# Patient Record
Sex: FEMALE | Race: WHITE | URBAN - METROPOLITAN AREA
[De-identification: names, ages, dates, MRNs, and addresses within clinical notes are randomized per-mention and may not be internally consistent; named-entity substitution may affect disease eponyms.]

---

## 2017-06-08 ENCOUNTER — TRANSFERRED RECORDS (OUTPATIENT)
Dept: HEALTH INFORMATION MANAGEMENT | Facility: CLINIC | Age: 67
End: 2017-06-08

## 2017-06-09 ENCOUNTER — TRANSFERRED RECORDS (OUTPATIENT)
Dept: HEALTH INFORMATION MANAGEMENT | Facility: CLINIC | Age: 67
End: 2017-06-09

## 2017-06-27 ENCOUNTER — TRANSFERRED RECORDS (OUTPATIENT)
Dept: HEALTH INFORMATION MANAGEMENT | Facility: CLINIC | Age: 67
End: 2017-06-27

## 2017-08-09 ENCOUNTER — TRANSFERRED RECORDS (OUTPATIENT)
Dept: HEALTH INFORMATION MANAGEMENT | Facility: CLINIC | Age: 67
End: 2017-08-09

## 2017-08-10 ENCOUNTER — MEDICAL CORRESPONDENCE (OUTPATIENT)
Dept: HEALTH INFORMATION MANAGEMENT | Facility: CLINIC | Age: 67
End: 2017-08-10

## 2017-09-05 ENCOUNTER — TELEPHONE (OUTPATIENT)
Dept: NEUROLOGY | Facility: CLINIC | Age: 67
End: 2017-09-05

## 2017-09-05 NOTE — TELEPHONE ENCOUNTER
Pt was called to arrange for an appointment in the ataxia clinic. She is referred by Dr. ALIRIO Parker from [facility].  Pt has problems walking and balance which started 7 years ago. Her brothers and sister do not have the same problems. She has been to speech therapy. She was raised by her biological parents and has one biological child of her own and none of these relatives have similar problems. She has not been in an accident and has not used alcohol. An appointment was made for Oct 6 at 9am to see Colin Burns and Dr Sameera Verde.

## 2017-09-22 ASSESSMENT — ENCOUNTER SYMPTOMS
ARTHRALGIAS: 0
DIZZINESS: 1
EYE IRRITATION: 0
PARALYSIS: 0
BACK PAIN: 1
TINGLING: 0
NUMBNESS: 0
MYALGIAS: 1
WEAKNESS: 0
DIARRHEA: 0
NECK PAIN: 0
EYE REDNESS: 0
BLOATING: 0
SEIZURES: 0
EYE PAIN: 0
EYE WATERING: 1
JOINT SWELLING: 0
TREMORS: 0
VOMITING: 0
JAUNDICE: 0
CONSTIPATION: 0
HEARTBURN: 1
RECTAL BLEEDING: 0
BLOOD IN STOOL: 0
MEMORY LOSS: 0
NAUSEA: 0
SPEECH CHANGE: 1
ABDOMINAL PAIN: 0
MUSCLE WEAKNESS: 0
LOSS OF CONSCIOUSNESS: 0
HEADACHES: 0
STIFFNESS: 0
RECTAL PAIN: 0
MUSCLE CRAMPS: 1
DOUBLE VISION: 0
BOWEL INCONTINENCE: 0
DISTURBANCES IN COORDINATION: 0

## 2017-09-28 ENCOUNTER — PRE VISIT (OUTPATIENT)
Dept: NEUROLOGY | Facility: CLINIC | Age: 67
End: 2017-09-28

## 2017-09-28 NOTE — TELEPHONE ENCOUNTER
1.  Date/reason for appt:10/6/17, Ataxia     2.  Referring provider: [facility]    3.  Call to patient (Yes / No - short description): No, referred     4.  Previous care at / records requested from:   [facility]- faxed cover sheet.

## 2017-09-29 NOTE — TELEPHONE ENCOUNTER
Records received from [facility].   Included  Office notes: 6/8/17, 6/22/17  PT/OT notes: 6/16/17, 8/9/17  Radiology reports: MRI brain 6/6/17  MR cervical 6/9/17

## 2017-10-06 ENCOUNTER — OFFICE VISIT (OUTPATIENT)
Dept: NEUROLOGY | Facility: CLINIC | Age: 67
End: 2017-10-06

## 2017-10-06 ENCOUNTER — TELEPHONE (OUTPATIENT)
Dept: NEUROLOGY | Facility: CLINIC | Age: 67
End: 2017-10-06

## 2017-10-06 VITALS
HEART RATE: 70 BPM | BODY MASS INDEX: 26.64 KG/M2 | WEIGHT: 175.8 LBS | RESPIRATION RATE: 24 BRPM | DIASTOLIC BLOOD PRESSURE: 76 MMHG | SYSTOLIC BLOOD PRESSURE: 143 MMHG | HEIGHT: 68 IN | OXYGEN SATURATION: 97 % | TEMPERATURE: 98.2 F

## 2017-10-06 VITALS
DIASTOLIC BLOOD PRESSURE: 76 MMHG | TEMPERATURE: 98.2 F | WEIGHT: 175.9 LBS | OXYGEN SATURATION: 97 % | HEART RATE: 70 BPM | HEIGHT: 68 IN | SYSTOLIC BLOOD PRESSURE: 143 MMHG | BODY MASS INDEX: 26.66 KG/M2 | RESPIRATION RATE: 24 BRPM

## 2017-10-06 DIAGNOSIS — G11.9 CEREBELLAR ATAXIA (H): ICD-10-CM

## 2017-10-06 DIAGNOSIS — G11.9 CEREBELLAR ATAXIA (H): Primary | ICD-10-CM

## 2017-10-06 DIAGNOSIS — R27.0 ATAXIA: Primary | ICD-10-CM

## 2017-10-06 PROBLEM — G31.9 CEREBELLAR ATROPHY (H): Status: ACTIVE | Noted: 2017-10-06

## 2017-10-06 PROBLEM — E89.0 HYPOTHYROIDISM FOLLOWING RADIOIODINE THERAPY: Status: ACTIVE | Noted: 2017-10-06

## 2017-10-06 LAB — THYROPEROXIDASE AB SERPL-ACNC: 31 IU/ML

## 2017-10-06 RX ORDER — LORAZEPAM 0.5 MG/1
1 TABLET ORAL PRN
COMMUNITY
Start: 2017-06-08

## 2017-10-06 RX ORDER — LEVOTHYROXINE SODIUM 75 UG/1
75 TABLET ORAL DAILY
COMMUNITY
Start: 2017-04-21 | End: 2018-04-21

## 2017-10-06 RX ORDER — CITALOPRAM HYDROBROMIDE 10 MG/1
30 TABLET ORAL DAILY
COMMUNITY
Start: 2017-02-28

## 2017-10-06 RX ORDER — FLUOCINOLONE ACETONIDE 0.11 MG/ML
1 OIL TOPICAL PRN
COMMUNITY
Start: 2016-02-26

## 2017-10-06 RX ORDER — ACETAMINOPHEN 500 MG
500 TABLET ORAL EVERY 6 HOURS PRN
COMMUNITY
Start: 2014-08-19

## 2017-10-06 RX ORDER — POLYETHYLENE GLYCOL 3350 17 G/17G
17 POWDER, FOR SOLUTION ORAL PRN
COMMUNITY
Start: 2016-01-11

## 2017-10-06 ASSESSMENT — PAIN SCALES - GENERAL
PAINLEVEL: NO PAIN (0)
PAINLEVEL: NO PAIN (0)

## 2017-10-06 NOTE — PROGRESS NOTES
"[facility] Neurology  [facility]    Anni Valverde  YOB: 1950  MRN: [id number]    Reason for visit: Referred by Dr. Parker from [facility] for evaluation of ataxia    History of Present Illness:    Anni Valverde is a 67 year old right handed woman who was referred for evaluation of ataxia.     She first noticed balance trouble about five years ago, but it was not severe enough to seek medical care. Three years ago, she had more trouble when standing at the sink washing her hair, so she saw her PCP. She noted some improvement with physical therapy. At the time they wondered if she had an inner ear issue. She has fallen about twice in the last few years. She is rather cautious with her walking. This year she sustained a fall on vacation while getting off a boat because there was no railing to hold onto. She is able to walk unassisted but she would prefer to hold her 's arm to cross the street and to get up the steps if there is no rail.     About one year ago she started having slurred speech, especially with lingual phonemes. Her speech has gotten slightly worse recently. Dysarthria tends to be worse when she is tired, anxious or stressed. In retrospect she noticed trouble enunciating the last couple words of sentences for years. She met with a speech therapist 6/2017 and was told she has a \"slight speech impediment\" and did not need ongoing therapy.     Patient denied lightheadedness, syncope, urinary incontinence, and fecal incontinence. No tremor. She denied problems with coordination in her arms. She did endorse problems with fine motor movements - she noticed issues with handwriting, jewelry, and fingernail clippers. She denied numbness/tingling. No weakness.     No history of meningitis, encephalitis or autoimmune disease. Of note, she had thyroid cancer s/p resection in 1998, which has been in remission since her surgery.    Medications:  Outpatient Prescriptions " "Marked as Taking for the 10/6/17 encounter (Office Visit) with Areli Verde MD   Medication Sig     LORazepam (ATIVAN) 0.5 MG tablet Take 1 tablet by mouth as needed     levothyroxine (SYNTHROID/LEVOTHROID) 75 MCG tablet Take 75 mcg by mouth daily     citalopram (CELEXA) 10 MG tablet Take 30 mg by mouth daily     Fluocinolone Acetonide (FLUOCINOLONE ACETAONIDE) 0.01 % oil Apply 1 Application topically as needed     polyethylene glycol (MIRALAX/GLYCOLAX) Packet Take 17 g by mouth as needed     acetaminophen (TYLENOL) 500 MG tablet Take 500 mg by mouth every 6 hours as needed     aspirin-acetaminophen-caffeine (EXCEDRIN MIGRAINE) 250-250-65 MG per tablet Take 1 tablet by mouth as needed     calcium-vitamin D (CALTRATE) 600-400 MG-UNIT per tablet Take 1 tablet by mouth 2 times daily       Past Medical History:  Papillary thyroid cancer, s/p resection in . In remission. Apparently was unusual because it was a \"hot nodule.\" Initially cancer was not suspected, but intraoperative touch prep showed papillary pattern and entire thyroid was removed.    Post operative hypothyroidism   Anxiety  Depression  Migraine - headaches stopped after FPC, patient thinks due to reduced stress    S/p Hysterectomy for endometriosis. Later also had BSO.     Family History:  Maternal grandmother had high blood pressure  Maternal grandfather didn't have any significant medical problems  Paternal grandfather had a heart attack at 88  Paternal grandmother  of \"old age\" at 91     Father was 85 when he , he had strokes, CAD  Mother  at age 85, dementia, kidney failure, CHF  Older sister (Viki), age 67, has health problems - pancreatitis, anxiety, GI bleeding, anemia, unclear diagnosis  Brother (Madalyn) is diabetic  Sister (Gosia) has high blood pressure    Daughter (Eliana), 44, is healthy.       Surgical History:  See above    Social History:  Originally from [place]. Moved to [place], [place] with her  " "for his work, then most recently to [place] to be near their daughter, son in law, and grandchildren. She is  to [place].     Retired, was  for [employer].    She is a never smoker.     Does not drink alcohol. No recreational drug use.     Allergies:  Ibuprofen - swelling in the lips with stronger than usual dose after surgery    Review of Systems:  Remainder of 10 point review of systems is negative. Pertinent positives and negatives above and in HPI    Physical Exam:    Vitals: Pulse 70  Resp 24  Ht 1.715 m (5' 7.5\")  Wt 79.8 kg (175 lb 14.4 oz)  SpO2 97%  Breastfeeding? No  BMI 27.14 kg/m2  General: Cooperative, no acute distress  HEENT: Normocephalic and nontraumatic, sclera white, moist mucous membranes  Cardiac: Regular rate and rhythm  Respiratory: Nonlabored breathing  Extremities: Distal pulses intact. No LE edema.    NEUROLOGIC:  MENTAL STATUS: Fully alert, attentive and oriented.  SPEECH: Spastic dysarthria noted. Sluring of lingual phonemes.   FACIAL EXPRESSION: Normal expression.     CRANIAL NERVES: Optic nerves flat, normal contour and color. Normal retina. Visual fields intact. EOM full with smooth pursuit. No nystagmus or diplopia. Normal saccades. Facial sensation intact/symmetric. Facial movements symmetric. No apraxia of eyelid opening. Blepharospasm of the right eye with forceful closing. Palate elevation symmetric, uvula midline. No palatal tremor. Tongue protrusion midline.    MOTOR:   INVOLUNTARY MOVEMENTS - No tremor at rest, with action or on posture. No myoclonus or asterixis.   AGILITY - Very slight slowing on the right with finger tapping (0.5 grading) and slight on the left with 1-2 interruptions (1), but hand opening closing and pronation/supination are normal. Amplitude of movements are normal. Foot tapping and leg agility nearly normal, just a bit slow.   TONE - Normal tone in the upper and lower extremities.   STRENGTH - No pronator drift. "   (MRC grading out of 5)   UE: SAb EF EE WE FDI    (R/L) 5/5 5/5 5/5 5/5 5/5      LE: HF KF KE DF    (R/L) 5/5 5/5 5/5 5/5     REFLEXES: Brisk without spread and symmetric at bilateral triceps, biceps, brachioradialis, patella and Achilles. No clonus. Toes upgoing bilaterally, but somewhat difficult to interpret due to patient withdrawing her foot. Negative Dominick.   SENSORY: Intact/symmetric to light touch, pinprick and vibratory sensation throughout upper and lower extremities.     COORDINATION: Finger-nose-finger without dysmetria on the right, slight dysmetria on the left. She has some delay with mirror movements. Mild dysdiadochokinesia noted in the bilateral upper extremities with rapid alternating movements. No rebound in the arms.   GAIT: Able to rise from chair with arms crossed over chest but with some hestitation. Posture is upright. Normal base, normal stride. Arm swing is diminished bilaterally. Turns well in a single step. Takes 1-2 steps with pull test. She is very unsteady with tandem.     Pertinent investigations:    Outside records reviewed to help establish timeline and guide testing    MRI brain personally reviewed. There is significant cerebellar atrophy. No clear pontine atrophy. No abnormal T2 signal seen in the cerebellum and no abnormal contrast enhancement. Curiously, on T1, there are hypodensities in the glory - but this is not seen on T2.     Impression:   Anni Valverde is a 67 year old female with a history of thyroid cancer who was referred for evaluation of slowly progressive ataxia over the course of 5 years. At this time, she has a cerebellar ataxia of unclear etiology. Broadly speaking, causes of chronic progressive ataxia include heritable ataxias, autoimmune conditions, vitamin deficiencies, neurodegenerative diseases and toxic exposure. If she were to have a heritable ataxia, it would likely be recessive due to lack of a family history, therefore risk to subsequent  generations should be quite small. However a sporadic ataxia or late onset autosomal dominant disease cannot be excluded. We considered the possibility of MSA-C, but her course has been very slow, which is atypical of MSA. Additionally, we do not see characteristic findings of MSA on her MRI. We will check for potentially reversible causes of ataxias below - vitamin deficiencies and autoimmune conditions.     Recommendations:  - TPO and anti-thyroglobulin antibodies  - Celiac panel IgG and IgA  - Anti-KENNY  - Vitamin E  - Consider genetic testing in the future, discussed with genetic counselor Colin Burns  - Follow up in one year    Patient seen and discussed with Dr. Verde.    Zelda Arreola MD  Movement Disorders Fellow

## 2017-10-06 NOTE — NURSING NOTE
Chief Complaint   Patient presents with     Consult     [facility]- ATAXIA- GENETIC COUNSELING     Candido Mcintyre, CMA

## 2017-10-06 NOTE — PROGRESS NOTES
GENETIC COUNSELING-Neurology clinic  I met with Anni and her  for 25 minutes in the ataxia clinic at the [facility].  Anni was referred for evaluation due to history of ataxia and cerebellar atrophy on MRI.    MEDICAL HISTORY  Please see Dr. Verde's clinic note for a more thorough summary of medical history. Briefly, Anni reports issues with balance beginning about 4 years ago and issues with slurred speech beginning about 1 year ago.  She has MRI finding of cerebellar atrophy.  From review of records, I do not see that any genetic testing has been performed. I did contact [facility] and there is no record there of genetic testing for ataxia.    FAMILY HISTORY-see scanned pedigree  1. Anni has 3 siblings, none of whom have any neurologic disease.  2. Anni has a healthy daughter and three grandchildren.  3. Anni's father  at age 85 from multiple strokes. No other neurologic disease reported in paternal family.  4. Anni's mother develop dementia in her late 70's and  at age 85. Anni's  indicated that Anni's mother's dementia did not seem the same as other friends that they have known with Alzheimer disease in that she seemed to lose her long term memory first.  5. A maternal aunt was paralyzed since childhood from an accident.  6. No other neurologic disease reported in maternal family.    GENETIC COUNSELING-  We discussed the genetic and environmental basis of ataxia. I explained that in most cases, it results from a complex interaction of multiple genetic and environmental factors. I explained that in some instances, we can identify a single gene cause. We discussed both autosomal dominant and autosomal recessive patterns of inheritance, which are the two most common patterns we observe with hereditary ataxias.  We discussed the implications of each pattern of inheritance for potential risks to siblings and children.  I explained that a dominant  form would place children at 50% risk, while a recessive form would translate into a minimal risk to children.    I explained that the family history certainly does NOT strongly suggest a dominant form given that both parents lived until age 85 without clear signs of ataxia although we cannot definitively exclude dominant inheritance based on family history.  I explained that we have now a fair bit of experience diagnosing autosomal recessive forms of ataxia/hereditary spastic paraplegia in adults with what appear to be isolated/sporadic neurologic disease--thus, this remains a distinct possibility.    We discussed potential options for gene testing. I explained that a small group of autosomal dominant ataxias (SCA1,2,3,6,7) represent the most frequent dominant ataxias and the types that would have the most significant concern in terms of risks to children and grandchildren (SCA2, SCA7 in particular).  Thus, while the family history does not strongly suggest a dominant form, one could pursue this targeted group of conditions to exclude the most common dominant forms and provide peace of mind in terms of potential risks to children and grandchildren.     Alternatively, if we presume that this is more likely a recessive disease, then we would likely consider a more broad-based next generation sequencing approach to look for hereditary ataxias/spastic paraplegias.      We discussed risks/benefits/limitaitons/utility of gene testing. I explained that in some cases, it may provide a precise diagnosis, which can be important for some patients. I also explained that if we identify a specific condition, the we could provide more precise guidance to family members about their risks.    For the time being, Zaida and her  wanted to discuss these options and will get back to me if they want to pursue genetic testing.    Colin Burns MS, [facility] [place]  Certified Genetic Counselor

## 2017-10-06 NOTE — MR AVS SNAPSHOT
"              After Visit Summary   10/6/2017    Anni Valverde    MRN: [id number]           Patient Information     Date Of Birth          1950        Visit Information        Provider Department      10/6/2017 9:00 AM Angelito Burns GC [facility]        Today's Diagnoses     Ataxia    -  1       Follow-ups after your visit        Who to contact     Please call your clinic at [phone] to:    Ask questions about your health    Make or cancel appointments    Discuss your medicines    Learn about your test results    Speak to your doctor   If you have compliments or concerns about an experience at your clinic, or if you wish to file a complaint, please contact [facility] Patient Relations at [phone] or email us at [email or web address]         Additional Information About Your Visit        MyChart Information     [employer] gives you secure access to your electronic health record. If you see a primary care provider, you can also send messages to your care team and make appointments. If you have questions, please call your primary care clinic.  If you do not have a primary care provider, please call [phone] and they will assist you.      [employer] is an electronic gateway that provides easy, online access to your medical records. With [employer], you can request a clinic appointment, read your test results, renew a prescription or communicate with your care team.     To access your existing account, please contact your [facility] or call [phone] for assistance.        Care EveryWhere ID     This is your Care EveryWhere ID. This could be used by other organizations to access your [place] medical records  [id number]        Your Vitals Were     Pulse Temperature Respirations Height Pulse Oximetry Breastfeeding?    70 98.2  F (36.8  C) 24 1.715 m (5' 7.5\") 97% No    BMI (Body Mass Index)                   27.13 kg/m2         "    Blood Pressure from Last 3 Encounters:   10/06/17 143/76   10/06/17 143/76    Weight from Last 3 Encounters:   10/06/17 79.7 kg (175 lb 12.8 oz)   10/06/17 79.8 kg (175 lb 14.4 oz)              Today, you had the following     No orders found for display       Primary Care Provider Office Phone # Fax #    Brendon Morgan [phone]       [facility] [address] [place] [address]        Equal Access to Services     [facility]: Hadii aad ku hadasho Soomaali, waaxda luqadaha, qaybta kaalmada adeegyada, waxpatricia zayas . So [facility] [phone].    ATENCIÓN: Si habla español, tiene a pendleton disposición servicios gratuitos de asistencia lingüística. [facility] [phone].    We comply with applicable federal civil rights laws and [place] laws. We do not discriminate on the basis of race, color, national origin, age, disability, sex, sexual orientation, or gender identity.            Thank you!     Thank you for choosing [facility]UR[facility]  for your care. Our goal is always to provide you with excellent care. Hearing back from our patients is one way we can continue to improve our services. Please take a few minutes to complete the written survey that you may receive in the mail after your visit with us. Thank you!             Your Updated Medication List - Protect others around you: Learn how to safely use, store and throw away your medicines at www.disposemymeds.org.          This list is accurate as of: 10/6/17 12:35 PM.  Always use your most recent med list.                   Brand Name Dispense Instructions for use Diagnosis    acetaminophen 500 MG tablet    TYLENOL     Take 500 mg by mouth every 6 hours as needed        aspirin-acetaminophen-caffeine 250-250-65 MG per tablet    EXCEDRIN MIGRAINE     Take 1 tablet by mouth as needed        calcium-vitamin D 600-400 MG-UNIT per tablet    CALTRATE     Take 1 tablet by mouth 2 times daily         citalopram 10 MG tablet    celeXA     Take 30 mg by mouth daily        fluocinolone acetaonide 0.01 % oil      Apply 1 Application topically as needed        levothyroxine 75 MCG tablet    SYNTHROID/LEVOTHROID     Take 75 mcg by mouth daily        LORazepam 0.5 MG tablet    ATIVAN     Take 1 tablet by mouth as needed        polyethylene glycol Packet    MIRALAX/GLYCOLAX     Take 17 g by mouth as needed

## 2017-10-06 NOTE — LETTER
10/6/2017       RE: Anni Valverde  [address]  [place] [facility] [address]     Dear Colleague,    Thank you for referring your patient, Anni Valverde, to the [facility]UROLOGY at [facility]. Please see a copy of my visit note below.    GENETIC COUNSELING-Neurology clinic  I met with Anni and her  for 25 minutes in the ataxia clinic at the [facility].  Anni was referred for evaluation due to history of ataxia and cerebellar atrophy on MRI.    MEDICAL HISTORY  Please see Dr. Vrede's clinic note for a more thorough summary of medical history. Briefly, Anni reports issues with balance beginning about 4 years ago and issues with slurred speech beginning about 1 year ago.  She has MRI finding of cerebellar atrophy.  From review of records, I do not see that any genetic testing has been performed. I did contact [facility] and there is no record there of genetic testing for ataxia.    FAMILY HISTORY-see scanned pedigree  1. Anni has 3 siblings, none of whom have any neurologic disease.  2. Anni has a healthy daughter and three grandchildren.  3. Anni's father  at age 85 from multiple strokes. No other neurologic disease reported in paternal family.  4. Anni's mother develop dementia in her late 70's and  at age 85. Anni's  indicated that Anni's mother's dementia did not seem the same as other friends that they have known with Alzheimer disease in that she seemed to lose her long term memory first.  5. A maternal aunt was paralyzed since childhood from an accident.  6. No other neurologic disease reported in maternal family.    GENETIC COUNSELING-  We discussed the genetic and environmental basis of ataxia. I explained that in most cases, it results from a complex interaction of multiple genetic and environmental factors. I explained that in some instances, we can identify a single gene cause. We discussed  both autosomal dominant and autosomal recessive patterns of inheritance, which are the two most common patterns we observe with hereditary ataxias.  We discussed the implications of each pattern of inheritance for potential risks to siblings and children.  I explained that a dominant form would place children at 50% risk, while a recessive form would translate into a minimal risk to children.    I explained that the family history certainly does NOT strongly suggest a dominant form given that both parents lived until age 85 without clear signs of ataxia although we cannot definitively exclude dominant inheritance based on family history.  I explained that we have now a fair bit of experience diagnosing autosomal recessive forms of ataxia/hereditary spastic paraplegia in adults with what appear to be isolated/sporadic neurologic disease--thus, this remains a distinct possibility.    We discussed potential options for gene testing. I explained that a small group of autosomal dominant ataxias (SCA1,2,3,6,7) represent the most frequent dominant ataxias and the types that would have the most significant concern in terms of risks to children and grandchildren (SCA2, SCA7 in particular).  Thus, while the family history does not strongly suggest a dominant form, one could pursue this targeted group of conditions to exclude the most common dominant forms and provide peace of mind in terms of potential risks to children and grandchildren.     Alternatively, if we presume that this is more likely a recessive disease, then we would likely consider a more broad-based next generation sequencing approach to look for hereditary ataxias/spastic paraplegias.      We discussed risks/benefits/limitaitons/utility of gene testing. I explained that in some cases, it may provide a precise diagnosis, which can be important for some patients. I also explained that if we identify a specific condition, the we could provide more precise  guidance to family members about their risks.    For the time being, Zaida and her  wanted to discuss these options and will get back to me if they want to pursue genetic testing.    Colin Burns MS, [facility] [place]  Certified Genetic Counselor

## 2017-10-06 NOTE — LETTER
"10/6/2017       RE: Anni Valverde  [address]  [facility] [address]     Dear Colleague,    Thank you for referring your patient, Anni Valverde, to the [facility] NEUROLOGY at [facility]. Please see a copy of my visit note below.    [facility] Neurology  Movement Disorder Clinic    Anni Valverde  YOB: 1950  MRN: [id number]    Reason for visit: Referred by Dr. Parker from [facility] for evaluation of ataxia    History of Present Illness:    Anni Valverde is a 67 year old right handed woman who was referred for evaluation of ataxia.     She first noticed balance trouble about five years ago, but it was not severe enough to seek medical care. Three years ago, she had more trouble when standing at the sink washing her hair, so she saw her PCP. She noted some improvement with physical therapy. At the time they wondered if she had an inner ear issue. She has fallen about twice in the last few years. She is rather cautious with her walking. This year she sustained a fall on vacation while getting off a boat because there was no railing to hold onto. She is able to walk unassisted but she would prefer to hold her 's arm to cross the street and to get up the steps if there is no rail.     About one year ago she started having slurred speech, especially with lingual phonemes. Her speech has gotten slightly worse recently. Dysarthria tends to be worse when she is tired, anxious or stressed. In retrospect she noticed trouble enunciating the last couple words of sentences for years. She met with a speech therapist 6/2017 and was told she has a \"slight speech impediment\" and did not need ongoing therapy.     Patient denied lightheadedness, syncope, urinary incontinence, and fecal incontinence. No tremor. She denied problems with coordination in her arms. She did endorse problems with fine motor movements - she noticed issues with handwriting, " "jewelry, and fingernail clippers. She denied numbness/tingling. No weakness.     No history of meningitis, encephalitis or autoimmune disease. Of note, she had thyroid cancer s/p resection in , which has been in remission since her surgery.    Medications:  Outpatient Prescriptions Marked as Taking for the 10/6/17 encounter (Office Visit) with Areli Verde MD   Medication Sig     LORazepam (ATIVAN) 0.5 MG tablet Take 1 tablet by mouth as needed     levothyroxine (SYNTHROID/LEVOTHROID) 75 MCG tablet Take 75 mcg by mouth daily     citalopram (CELEXA) 10 MG tablet Take 30 mg by mouth daily     Fluocinolone Acetonide (FLUOCINOLONE ACETAONIDE) 0.01 % oil Apply 1 Application topically as needed     polyethylene glycol (MIRALAX/GLYCOLAX) Packet Take 17 g by mouth as needed     acetaminophen (TYLENOL) 500 MG tablet Take 500 mg by mouth every 6 hours as needed     aspirin-acetaminophen-caffeine (EXCEDRIN MIGRAINE) 250-250-65 MG per tablet Take 1 tablet by mouth as needed     calcium-vitamin D (CALTRATE) 600-400 MG-UNIT per tablet Take 1 tablet by mouth 2 times daily       Past Medical History:  Papillary thyroid cancer, s/p resection in . In remission. Apparently was unusual because it was a \"hot nodule.\" Initially cancer was not suspected, but intraoperative touch prep showed papillary pattern and entire thyroid was removed.    Post operative hypothyroidism   Anxiety  Depression  Migraine - headaches stopped after half-way, patient thinks due to reduced stress    S/p Hysterectomy for endometriosis. Later also had BSO.     Family History:  Maternal grandmother had high blood pressure  Maternal grandfather didn't have any significant medical problems  Paternal grandfather had a heart attack at 88  Paternal grandmother  of \"old age\" at 91     Father was 85 when he , he had strokes, CAD  Mother  at age 85, dementia, kidney failure, CHF  Older sister (Viki), age 67, has health problems - " "pancreatitis, anxiety, GI bleeding, anemia, unclear diagnosis  Brother (Madalyn) is diabetic  Sister (Gosia) has high blood pressure    Daughter (Eliana), 44, is healthy.       Surgical History:  See above    Social History:  Originally from [place]. Moved to [place], [place] with her  for his work, then most recently to [place] to be near their daughter, son in law, and grandchildren. She is  to [place].     Retired, was  for [employer].    She is a never smoker.     Does not drink alcohol. No recreational drug use.     Allergies:  Ibuprofen - swelling in the lips with stronger than usual dose after surgery    Review of Systems:  Remainder of 10 point review of systems is negative. Pertinent positives and negatives above and in HPI    Physical Exam:    Vitals: Pulse 70  Resp 24  Ht 1.715 m (5' 7.5\")  Wt 79.8 kg (175 lb 14.4 oz)  SpO2 97%  Breastfeeding? No  BMI 27.14 kg/m2  General: Cooperative, no acute distress  HEENT: Normocephalic and nontraumatic, sclera white, moist mucous membranes  Cardiac: Regular rate and rhythm  Respiratory: Nonlabored breathing  Extremities: Distal pulses intact. No LE edema.    NEUROLOGIC:  MENTAL STATUS: Fully alert, attentive and oriented.  SPEECH: Spastic dysarthria noted. Sluring of lingual phonemes.   FACIAL EXPRESSION: Normal expression.     CRANIAL NERVES: Optic nerves flat, normal contour and color. Normal retina. Visual fields intact. EOM full with smooth pursuit. No nystagmus or diplopia. Normal saccades. Facial sensation intact/symmetric. Facial movements symmetric. No apraxia of eyelid opening. Blepharospasm of the right eye with forceful closing. Palate elevation symmetric, uvula midline. No palatal tremor. Tongue protrusion midline.    MOTOR:   INVOLUNTARY MOVEMENTS - No tremor at rest, with action or on posture. No myoclonus or asterixis.   AGILITY - Very slight slowing on the right with finger tapping (0.5 grading) and " slight on the left with 1-2 interruptions (1), but hand opening closing and pronation/supination are normal. Amplitude of movements are normal. Foot tapping and leg agility nearly normal, just a bit slow.   TONE - Normal tone in the upper and lower extremities.   STRENGTH - No pronator drift.   (MRC grading out of 5)   UE: SAb EF EE WE FDI    (R/L) 5/5 5/5 5/5 5/5 5/5      LE: HF KF KE DF    (R/L) 5/5 5/5 5/5 5/5     REFLEXES: Brisk without spread and symmetric at bilateral triceps, biceps, brachioradialis, patella and Achilles. No clonus. Toes upgoing bilaterally, but somewhat difficult to interpret due to patient withdrawing her foot. Negative Dominick.   SENSORY: Intact/symmetric to light touch, pinprick and vibratory sensation throughout upper and lower extremities.     COORDINATION: Finger-nose-finger without dysmetria on the right, slight dysmetria on the left. She has some delay with mirror movements. Mild dysdiadochokinesia noted in the bilateral upper extremities with rapid alternating movements. No rebound in the arms.   GAIT: Able to rise from chair with arms crossed over chest but with some hestitation. Posture is upright. Normal base, normal stride. Arm swing is diminished bilaterally. Turns well in a single step. Takes 1-2 steps with pull test. She is very unsteady with tandem.     Pertinent investigations:    Outside records reviewed to help establish timeline and guide testing    MRI brain personally reviewed. There is significant cerebellar atrophy. No clear pontine atrophy. No abnormal T2 signal seen in the cerebellum and no abnormal contrast enhancement. Curiously, on T1, there are hypodensities in the glory - but this is not seen on T2.     Impression:   Anni Valverde is a 67 year old female with a history of thyroid cancer who was referred for evaluation of slowly progressive ataxia over the course of 5 years. At this time, she has a cerebellar ataxia of unclear etiology. Broadly speaking,  causes of chronic progressive ataxia include heritable ataxias, autoimmune conditions, vitamin deficiencies, neurodegenerative diseases and toxic exposure. If she were to have a heritable ataxia, it would likely be recessive due to lack of a family history, therefore risk to subsequent generations should be quite small. However a sporadic ataxia or late onset autosomal dominant disease cannot be excluded. We considered the possibility of MSA-C, but her course has been very slow, which is atypical of MSA. Additionally, we do not see characteristic findings of MSA on her MRI. We will check for potentially reversible causes of ataxias below - vitamin deficiencies and autoimmune conditions.     Recommendations:  - TPO and anti-thyroglobulin antibodies  - Celiac panel IgG and IgA  - Anti-KENNY  - Vitamin E  - Consider genetic testing in the future, discussed with genetic counselor Colin Burns  - Follow up in one year    Patient seen and discussed with Dr. Verde.    Zelda Arreola MD  Movement Disorders Fellow      Again, thank you for allowing me to participate in the care of your patient.      Sincerely,    Areli Verde MD

## 2017-10-06 NOTE — MR AVS SNAPSHOT
After Visit Summary   10/6/2017    Anni Valverde    MRN: [id number]           Patient Information     Date Of Birth          1950        Visit Information        Provider Department      10/6/2017 9:00 AM Areli Verde MD [facility] Neurology        Today's Diagnoses     Cerebellar ataxia (H)    -  1      Care Instructions    Today you saw Dr Arreola & Dr Verde and genetic counselor Colin Burns for evaluation of cerebellar ataxia. We are unsure of the cause of the cerebellar ataxia at this time. The causes could be a sporadic genetic condition, recessive genetic condition, combination of genetics and environment, vitamin deficiency, autoimmune process (related to abnormal antibody) or Celiac disease. We will check some labs today to look for treatable causes of cerebellar ataxia.     We will also send you with information regarding genetic testing for you to consider in the future.           Follow-ups after your visit        Future tests that were ordered for you today     Open Future Orders        Priority Expected Expires Ordered    Thyroid peroxidase antibody Routine  10/6/2018 10/6/2017    Thyroglobulin and antibody Routine  10/6/2018 10/6/2017    Tissue transglutaminase jaquan IgA and IgG Routine  10/6/2018 10/6/2017    Glutamic acid decarboxylase antibody Routine  10/6/2018 10/6/2017    Vitamin E Routine  10/6/2018 10/6/2017            Who to contact     Please call your clinic at [phone] to:    Ask questions about your health    Make or cancel appointments    Discuss your medicines    Learn about your test results    Speak to your doctor   If you have compliments or concerns about an experience at your clinic, or if you wish to file a complaint, please contact [facility] Patient Relations at [phone] or email us at [email or web address]         Additional Information About Your Visit        MyChart Information     MyChart gives you  "secure access to your electronic health record. If you see a primary care provider, you can also send messages to your care team and make appointments. If you have questions, please call your primary care clinic.  If you do not have a primary care provider, please call [phone] and they will assist you.      artaculous is an electronic gateway that provides easy, online access to your medical records. With artaculous, you can request a clinic appointment, read your test results, renew a prescription or communicate with your care team.     To access your existing account, please contact your [facility] or call [phone] for assistance.        Care EveryWhere ID     This is your Care EveryWhere ID. This could be used by other organizations to access your [facility] medical records  [id number]        Your Vitals Were     Pulse Temperature Respirations Height Pulse Oximetry Breastfeeding?    70 98.2  F (36.8  C) (Oral) 24 1.715 m (5' 7.5\") 97% No    BMI (Body Mass Index)                   27.14 kg/m2            Blood Pressure from Last 3 Encounters:   10/06/17 143/76   10/06/17 143/76    Weight from Last 3 Encounters:   10/06/17 79.7 kg (175 lb 12.8 oz)   10/06/17 79.8 kg (175 lb 14.4 oz)               Primary Care Provider Office Phone # Fax #    Brendon Morgan [phone]       [facility] [address] [place] [address]        Equal Access to Services     LUANA DELGADO : Hadii aad ku hadasho Soomaali, waaxda luqadaha, qaybta kaalmada adeegyada, andrzej zayas . So [facility] [phone].    ATENCIÓN: Si habla español, tiene a pendleton disposición servicios gratuitos de asistencia lingüística. Llame al [phone].    We comply with applicable federal civil rights laws and [place] laws. We do not discriminate on the basis of race, color, national origin, age, disability, sex, sexual orientation, or gender identity.          "   Thank you!     Thank you for choosing [facility] NEUROLOGY  for your care. Our goal is always to provide you with excellent care. Hearing back from our patients is one way we can continue to improve our services. Please take a few minutes to complete the written survey that you may receive in the mail after your visit with us. Thank you!             Your Updated Medication List - Protect others around you: Learn how to safely use, store and throw away your medicines at www.disposemymeds.org.          This list is accurate as of: 10/6/17 10:59 AM.  Always use your most recent med list.                   Brand Name Dispense Instructions for use Diagnosis    acetaminophen 500 MG tablet    TYLENOL     Take 500 mg by mouth every 6 hours as needed        aspirin-acetaminophen-caffeine 250-250-65 MG per tablet    EXCEDRIN MIGRAINE     Take 1 tablet by mouth as needed        calcium-vitamin D 600-400 MG-UNIT per tablet    CALTRATE     Take 1 tablet by mouth 2 times daily        citalopram 10 MG tablet    celeXA     Take 30 mg by mouth daily        fluocinolone acetaonide 0.01 % oil      Apply 1 Application topically as needed        levothyroxine 75 MCG tablet    SYNTHROID/LEVOTHROID     Take 75 mcg by mouth daily        LORazepam 0.5 MG tablet    ATIVAN     Take 1 tablet by mouth as needed        polyethylene glycol Packet    MIRALAX/GLYCOLAX     Take 17 g by mouth as needed

## 2017-10-06 NOTE — TELEPHONE ENCOUNTER
Mailed Application for Disability Parking Certificate to the [place] [employer] Public Safety, placed copy in basket for scanning.

## 2017-10-06 NOTE — PATIENT INSTRUCTIONS
Today you saw Dr Arreola & Dr Verde and genetic counselor Colin Burns for evaluation of cerebellar ataxia. We are unsure of the cause of the cerebellar ataxia at this time. The causes could be a sporadic genetic condition, recessive genetic condition, combination of genetics and environment, vitamin deficiency, autoimmune process (related to abnormal antibody) or Celiac disease. We will check some labs today to look for treatable causes of cerebellar ataxia.     We will also send you with information regarding genetic testing for you to consider in the future.

## 2017-10-08 LAB — GAD65 AB SER IA-ACNC: <5 IU/ML (ref 0–5)

## 2017-10-09 LAB
A-TOCOPHEROL VIT E SERPL-MCNC: 11.2 MG/L (ref 5.5–18)
BETA+GAMMA TOCOPHEROL SERPL-MCNC: 2.3 MG/L (ref 0–6)

## 2017-10-10 LAB
TTG IGA SER-ACNC: <1 U/ML
TTG IGG SER-ACNC: <1 U/ML

## 2017-10-17 LAB — LAB SCANNED RESULT: NORMAL

## 2018-03-09 NOTE — PROGRESS NOTES
I have personally interviewed and examined Ms. Anni Valverde and agree with diagnosis and management. The total time spent with the patient was 45 minutes, over 50% of the time spent in counseling and coordinating care.

## 2018-04-22 ENCOUNTER — HOSPITAL ENCOUNTER (EMERGENCY)
Facility: CLINIC | Age: 68
Discharge: HOME | End: 2018-04-22
Attending: EMERGENCY MEDICINE | Admitting: EMERGENCY MEDICINE
Payer: MEDICARE

## 2018-04-22 VITALS
DIASTOLIC BLOOD PRESSURE: 69 MMHG | HEART RATE: 92 BPM | WEIGHT: 170 LBS | SYSTOLIC BLOOD PRESSURE: 150 MMHG | RESPIRATION RATE: 16 BRPM | HEIGHT: 67 IN | BODY MASS INDEX: 26.68 KG/M2 | OXYGEN SATURATION: 97 % | TEMPERATURE: 98.4 F
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
## 2018-04-22 DIAGNOSIS — S01.81XA FACIAL LACERATION, INITIAL ENCOUNTER: ICD-10-CM

## 2018-04-22 PROCEDURE — 99283 EMERGENCY DEPT VISIT LOW MDM: CPT

## 2018-04-22 ASSESSMENT — ENCOUNTER SYMPTOMS
WOUND: 1
CHILLS: 0
NAUSEA: 0
VOMITING: 0
FEVER: 0
COUGH: 0
NUMBNESS: 0

## 2018-04-22 NOTE — ED PROVIDER NOTES
"  History     Chief Complaint:  Fall    HPI   Anni Valverde is a 68 year old female who presents to the ED for evaluation after a fall. 30 minutes ago the patient was walking when she tripped over a bump on the road and fell face forward onto the concrete. She reports getting an abrasion to her nose and \"spitting up skin\". The patient reports having ataxia as well due to a medical history of cerebellar atrophy. The patient denies loss of consciousness, nausea, vomiting, numbness, or taking any drugs or alcohol today. She is not currently on any blood thinners. The patient's last tetanus shot was in 2009.    Allergies:  Ibuprofen     Medications:    Tylenol  Excedrin  Caltrate  Celexa  Synthroid  Ativan  Miralax    Past Medical History:    Anxiety  Depression  Endometriosis  Hypothyroidism  Thyroid Cancer  Cerebellar atrophy  Ataxia    Past Surgical History:    Hysterectomy  Salpingo-oophorectomy bilateral  Thyroidectomy    Family History:    CAD  Pancreatitis  Anemia  Diabetes  Dementia  Kidney Disease    Social History:  Smoking status: Never  Alcohol use: No  Marital Status:  Single [1]     Review of Systems   Constitutional: Negative for chills and fever.   Respiratory: Negative for cough.    Gastrointestinal: Negative for nausea and vomiting.   Skin: Positive for wound.   Neurological: Negative for numbness.        No LOC   All other systems reviewed and are negative.    Physical Exam     Patient Vitals for the past 24 hrs:   BP Temp Temp src Pulse Resp SpO2 Height Weight   04/22/18 1841 150/69 98.4  F (36.9  C) Temporal 92 16 97 % 1.702 m (5' 7\") 77.1 kg (170 lb)         Physical Exam  Vitals: reviewed by me  General: Pt seen on [facility], pleasant, cooperative, and alert to conversation, non-ill-appearing, nondiaphoretic  Eyes: Tracking well, clear conjunctiva BL  ENT: MMM, midline trachea.  No nasal septal hematoma.  Does have 1 cm lack to bridge of nose, dried blood throughout this area as well as " in her naris, no active bleeding, full range of motion to neck, no tenderness to palpation to C-spine.  CV: Rate as above, regular rhythm.    MSK: no peripheral edema or joint effusion.  No evidence of trauma  Skin: No rash, normal turgor and temperature  Neuro: Clear speech and no facial droop.  Moving all extremities spontaneously, following all commands.  Cranial nerves II through XII are intact bilaterally.  Patient appears steady on her feet  Psych: Not RIS, no e/o AH/VH      Emergency Department Course   Procedures:    Narrative: Procedure: Laceration Repair        LACERATION:  A superficial clean 1 cm laceration.      LOCATION:  Bridge of nose      FUNCTION:  Distally sensation and circulation are intact.      ANESTHESIA:  None      PREPARATION:  Irrigation and Scrubbing with Shur Clens      DEBRIDEMENT:  no debridement and wound explored, no foreign body found      CLOSURE:  Wound was closed with steri-strips    Emergency Department Course:  Past medical records, nursing notes, and vitals reviewed.  6:53pm: I performed an exam of the patient and obtained history, as documented above.     7:51pm: I rechecked the patient. Explained findings to the patient. I performed a laceration repair as documented above.    I rechecked the patient. Findings and plan explained to the Patient. Patient discharged home with instructions regarding supportive care, medications, and reasons to return. The importance of close follow-up was reviewed.     Impression & Plan    Medical Decision Making:  Anni Valverde is a 68 year old female who presents to the ER after an abdominal fall likely due to her chronic baseline ataxia. Patient has a normal neurologic exam here in the ER, was acting normally per family, no loss of consciousness, neck is clearable by NEXUS, and I did not feel that CT head was indicated at this time. Patient does have same nasal laceration which was amenable to steri strips only, no sutures needed. Tetanus  is up to date. Patient has no evidence of facial fracture or deviated septum; I did not feel as though plain films or CT of the face would be beneficial to the patient. Patient is okay with the plan to go home with wound care, patient at bedside is okay with this plan as well and will be discharged will very clear return to ED precautions and follow up with primary care as above.      Diagnosis:    ICD-10-CM   1. Facial laceration, initial encounter S01.81XA   2. Fall, initial encounter W19.XXXA       Disposition:  discharged to home        Consuelo Talley  4/22/2018    EMERGENCY DEPARTMENT  I, Consuelo Talley, am serving as a scribe at 6:53 PM on 4/22/2018 to document services personally performed by Jermain Kohler MD based on my observations and the provider's statements to me.        Jermain Kohler MD  04/22/18 2127

## 2018-04-22 NOTE — ED AVS SNAPSHOT
Emergency Department    [address] [facility] [address]    Phone:  [phone]    Fax:  [phone]                                       Anni Valverde   MRN: [id number]    Department:   Emergency Department   Date of Visit:  4/22/2018           Patient Information     Date Of Birth          1950        Your diagnoses for this visit were:     Facial laceration, initial encounter     Fall, initial encounter        You were seen by Jermain Kohler MD.      Follow-up Information     Follow up with Brendon Morgan MD. Schedule an appointment as soon as possible for a visit in 3 days.    Specialty:  Internal Medicine    Why:  As needed, For wound re-check    Contact information:    [facility]  [address] [place] [address] [phone]          Follow up with  Emergency Department.    Specialty:  EMERGENCY MEDICINE    Why:  If symptoms worsen    Contact information:    [address] [address] [phone]        Discharge Instructions         Preventing Falls: Are You At Risk of Falling?     Ask for help to reduce risk of falling in your home.     As you get older, you're not as steady on your feet as you once were. And you may have health problems you didn't have when you were younger. So, it's not surprising that older people are more likely to trip and fall. Falling can be very serious. It can change your overall health and quality of life. That's why it's important to be aware of your own risk of falling.  The dangers of falling  Falls are one of the main causes of injury in people over age 65. An older person who falls may take longer to get better than a younger person. And, after a fall, an older person is more likely to have problems that don't go away. So, preventing falls can help you avoid serious health problems.  Are you at risk of falling?  Answer these questions to rate your level of  "risk.    Are you a woman?    Have you fallen or stumbled in the last year?    Are you over age 65?    Are you ever dizzy or lightheaded with standing?    Do you have a hard time getting in and out of the bathtub or on and off the toilet?    Do you lean on objects to help you get around? Or do you use a cane or walker?    Do you have vision or hearing problems? For example, do you need new glasses or hearing aids?    Do you have 2 or more long-lasting (chronic) medical conditions?    Do you take 3 or more medicines?    Have you felt depressed recently?    Have you had more trouble with your memory in recent months?    Are there hazards in your home that might cause you to fall, such as loose rugs or poor lighting?    Do you have a pet that jumps on you or might trip you?    Have you stopped getting regular exercise?    Do you have diabetes?     Do you have a neurologic disease, such as Parkinson or Alzheimer disease?     Do you drink alcohol?    Do you wear athletic shoes or slippers, or go barefoot at home?  You can help prevent falls  If you answered \"yes\" to any of the above questions, take steps to reduce your risk of a fall. Monitoring health conditions and keeping walkways in your home free of clutter are just two ways. Changing is sometimes easier said than done. But keep in mind that even small changes can make you less likely to fall.  The fear of falling  It's normal to be scared of falling, especially if you've fallen before. But being afraid can actually make you more likely to fall. This is because:    Fear might cause you to become less active. Being less active can lead to a loss of strength and balance.    Fear can lead to isolation from others, depression, or the use of more medicines or alcohol. And all these things make falling even more likely.  To break the cycle, learn more about ways to avoid falling. As you take control, you may find yourself feeling less afraid.   Date Last Reviewed: " 5/1/2017 2000-2017 [employer]. [address] [place] [address], [place] [address]. All rights reserved. This information is not intended as a substitute for professional medical care. Always follow your healthcare professional's instructions.      Discharge Instructions  Laceration (Cut)    You were seen today for a laceration (cut).  Your doctor examined your laceration for any problems such a buried foreign body (like glass, a splinter, or gravel), or injury to blood vessels, tendons, and nerves.  Your doctor may have also rinsed and/or scrubbed your laceration to help prevent an infection.  Your laceration may have been closed with glue, staples or sutures (stitches).      It may not be possible to find all problems with your laceration on the first visit, and we can't always prevent infections.  Antibiotics are only given when the benefit is more than the risk, and don't prevent all infections. Some lacerations are too high risk to close, and are left open to heal.  All lacerations, no matter how expertly repaired, will cause scarring.    Return to the Emergency Department right away if:    You have more redness, swelling, pain, drainage (pus), a bad smell, or red streaking from your laceration.      You have a fever of 101oF or more.    You have bleeding that you can t stop at home. If your cut starts to bleed, hold pressure on the bleeding area with a clean cloth or put pressure over the bandage.  If the bleeding doesn t stop after using constant pressure for 30 minutes, you should return to the Emergency Department for further treatment.    An area past the laceration is cool, pale, or blue compared with the other side, or has a slower return of color when squeezed.    Your dressing seems too tight or starts to get uncomfortable or painful.    You have loss of normal function or use of an area, such as being unable to straighten or bend a finger normally.    You have a numb area past the  "laceration.    Return to the Emergency Department or see your regular doctor if:    The laceration starts to come open.     You have something coming out of the cut or a feeling that there is something in the laceration.    Your wound will not heal, or keeps breaking open. There can always be glass, wood, dirt or other things in any wound.  They won t always show up, even on x-rays.  If a wound doesn t heal, this may be why, and it is important to follow-up with your regular doctor.    Home Care:    Take your dressing off in 12 hours, or as instructed by your doctor, to check your laceration. Remove the dressing sooner if it seems too tight or painful, or if it is getting numb, tingly, or pale past the dressing.    Gently wash your laceration 2 times a day with clean cloth and soap.     It is okay to shower, but do not let the laceration soak in water.      If your laceration was closed with wound adhesive or strips: pat it dry and leave it open to the air.     For all other repairs: after you wash your laceration, or at least 2 times a day, apply bacitracin or other antibiotic ointment to the laceration, then cover it with a Band-Aid  or gauze.    Keep the laceration clean. Wear gloves or other protective clothing if you are around dirt.    Follow-up:    You need to follow-up with your regular doctor in 2-3 days.    Scars:  To help minimize scarring:    Wear sunscreen over the healed laceration when out in the sun.    Massage the area regularly.    You may use Vitamin E oil.    Wait a year.  Most scars will start to fade within a year.    Probiotics: If you have been given an antibiotic, you may want to also take a probiotic pill or eat yogurt with live cultures. Probiotics have \"good bacteria\" to help your intestines stay healthy. Studies have shown that probiotics help prevent diarrhea and other intestine problems (including C. diff infection) when you take antibiotics. You can buy these without a prescription in " the pharmacy section of the store.     If you were given a prescription for medicine here today, be sure to read all of the information (including the package insert) that comes with your prescription.  This will include important information about the medicine, its side effects, and any warnings that you need to know about.  The pharmacist who fills the prescription can provide more information and answer questions you may have about the medicine.  If you have questions or concerns that the pharmacist cannot address, please call or return to the Emergency Department.     Opioid Medication Information    Pain medications are among the most commonly prescribed medicines, so we are including this information for all our patients. If you did not receive pain medication or get a prescription for pain medicine, you can ignore it.     You may have been given a prescription for an opioid (narcotic) pain medicine and/or have received a pain medicine while here in the Emergency Department. These medicines can make you drowsy or impaired. You must not drive, operate dangerous equipment, or engage in any other dangerous activities while taking these medications. If you drive while taking these medications, you could be arrested for DUI, or driving under the influence. Do not drink any alcohol while you are taking these medications.     Opioid pain medications can cause addiction. If you have a history of chemical dependency of any type, you are at a higher risk of becoming addicted to pain medications.  Only take these prescribed medications to treat your pain when all other options have been tried. Take it for as short a time and as few doses as possible. Store your pain pills in a secure place, as they are frequently stolen and provide a dangerous opportunity for children or visitors in your house to start abusing these powerful medications. We will not replace any lost or stolen medicine.  As soon as your pain is better,  you should flush all your remaining medication.     Many prescription pain medications contain Tylenol  (acetaminophen), including Vicodin , Tylenol #3 , Norco , Lortab , and Percocet .  You should not take any extra pills of Tylenol  if you are using these prescription medications or you can get very sick.  Do not ever take more than 3000 mg of acetaminophen in any 24 hour period.    All opioids tend to cause constipation. Drink plenty of water and eat foods that have a lot of fiber, such as fruits, vegetables, prune juice, apple juice and high fiber cereal.  Take a laxative if you don t move your bowels at least every other day. Miralax , Milk of Magnesia, Colace , or Senna  can be used to keep you regular.      Remember that you can always come back to the Emergency Department if you are not able to see your regular doctor in the amount of time listed above, if you get any new symptoms, or if there is anything that worries you.      24 Hour Appointment Hotline       To make an appointment at any [facility], call [phone] ([phone]). If you don't have a family doctor or clinic, we will help you find one. [facility] are conveniently located to serve the needs of you and your family.             Review of your medicines      Our records show that you are taking the medicines listed below. If these are incorrect, please call your family doctor or clinic.        Dose / Directions Last dose taken    acetaminophen 500 MG tablet   Commonly known as:  TYLENOL   Dose:  500 mg        Take 500 mg by mouth every 6 hours as needed   Refills:  0        aspirin-acetaminophen-caffeine 250-250-65 MG per tablet   Commonly known as:  EXCEDRIN MIGRAINE   Dose:  1 tablet        Take 1 tablet by mouth as needed   Refills:  0        calcium-vitamin D 600-400 MG-UNIT per tablet   Commonly known as:  CALTRATE   Dose:  1 tablet        Take 1 tablet by mouth 2 times daily   Refills:  0        citalopram 10 MG tablet    Commonly known as:  celeXA   Dose:  30 mg        Take 30 mg by mouth daily   Refills:  0        fluocinolone acetonide 0.01 % oil   Dose:  1 Application        Apply 1 Application topically as needed   Refills:  0        levothyroxine 75 MCG tablet   Commonly known as:  SYNTHROID/LEVOTHROID   Dose:  75 mcg        Take 75 mcg by mouth daily   Refills:  0        LORazepam 0.5 MG tablet   Commonly known as:  ATIVAN   Dose:  1 tablet        Take 1 tablet by mouth as needed   Refills:  0        polyethylene glycol Packet   Commonly known as:  MIRALAX/GLYCOLAX   Dose:  17 g        Take 17 g by mouth as needed   Refills:  0                Orders Needing Specimen Collection     None      Pending Results     No orders found from 4/20/2018 to 4/23/2018.            Pending Culture Results     No orders found from 4/20/2018 to 4/23/2018.            Pending Results Instructions     If you had any lab results that were not finalized at the time of your Discharge, you can call the ED Lab Result RN at [phone]. You will be contacted by this team for any positive Lab results or changes in treatment. The nurses are available 7 days a week from 10A to 6:30P.  You can leave a message 24 hours per day and they will return your call.        Test Results From Your Hospital Stay               Clinical Quality Measure: Blood Pressure Screening     Your blood pressure was checked while you were in the emergency department today. The last reading we obtained was  BP: 150/69 . Please read the guidelines below about what these numbers mean and what you should do about them.  If your systolic blood pressure (the top number) is less than 120 and your diastolic blood pressure (the bottom number) is less than 80, then your blood pressure is normal. There is nothing more that you need to do about it.  If your systolic blood pressure (the top number) is 120-139 or your diastolic blood pressure (the bottom number) is 80-89, your blood pressure  may be higher than it should be. You should have your blood pressure rechecked within a year by a primary care provider.  If your systolic blood pressure (the top number) is 140 or greater or your diastolic blood pressure (the bottom number) is 90 or greater, you may have high blood pressure. High blood pressure is treatable, but if left untreated over time it can put you at risk for heart attack, stroke, or kidney failure. You should have your blood pressure rechecked by a primary care provider within the next 4 weeks.  If your provider in the emergency department today gave you specific instructions to follow-up with your doctor or provider even sooner than that, you should follow that instruction and not wait for up to 4 weeks for your follow-up visit.        Thank you for choosing [facility]       Thank you for choosing [facility] for your care. Our goal is always to provide you with excellent care. Hearing back from our patients is one way we can continue to improve our services. Please take a few minutes to complete the written survey that you may receive in the mail after you visit with us. Thank you!        [email or web address] Information     [employer] gives you secure access to your electronic health record. If you see a primary care provider, you can also send messages to your care team and make appointments. If you have questions, please call your primary care clinic.  If you do not have a primary care provider, please call [phone] and they will assist you.        Care EveryWhere ID     This is your Care EveryWhere ID. This could be used by other organizations to access your [facility] medical records  [id number]        Equal Access to Services     LUANA DELGADO : Hadii valentina Rojo, waeffie seymour, qaybamrita kaalandrzej cuellar. So [facility] [phone].    ATENCIÓN: Si habla español, tiene a pendleton disposición servicios gratuitos de asistencia lingüística. Llame al  [phone].    We comply with applicable federal civil rights laws and [place] laws. We do not discriminate on the basis of race, color, national origin, age, disability, sex, sexual orientation, or gender identity.            After Visit Summary       This is your record. Keep this with you and show to your community pharmacist(s) and doctor(s) at your next visit.

## 2018-04-22 NOTE — ED AVS SNAPSHOT
Emergency Department    [address] [place] [address]    Phone:  [phone]    Fax:  [phone]                                       Anni Valverde   MRN: [id number]    Department:   Emergency Department   Date of Visit:  4/22/2018           After Visit Summary Signature Page     I have received my discharge instructions, and my questions have been answered. I have discussed any challenges I see with this plan with the nurse or doctor.    ..........................................................................................................................................  Patient/Patient Representative Signature      ..........................................................................................................................................  Patient Representative Print Name and Relationship to Patient    ..................................................               ................................................  Date                                            Time    ..........................................................................................................................................  Reviewed by Signature/Title    ...................................................              ..............................................  Date                                                            Time

## 2018-04-23 NOTE — DISCHARGE INSTRUCTIONS
"  Preventing Falls: Are You At Risk of Falling?     Ask for help to reduce risk of falling in your home.     As you get older, you're not as steady on your feet as you once were. And you may have health problems you didn't have when you were younger. So, it's not surprising that older people are more likely to trip and fall. Falling can be very serious. It can change your overall health and quality of life. That's why it's important to be aware of your own risk of falling.  The dangers of falling  Falls are one of the main causes of injury in people over age 65. An older person who falls may take longer to get better than a younger person. And, after a fall, an older person is more likely to have problems that don't go away. So, preventing falls can help you avoid serious health problems.  Are you at risk of falling?  Answer these questions to rate your level of risk.    Are you a woman?    Have you fallen or stumbled in the last year?    Are you over age 65?    Are you ever dizzy or lightheaded with standing?    Do you have a hard time getting in and out of the bathtub or on and off the toilet?    Do you lean on objects to help you get around? Or do you use a cane or walker?    Do you have vision or hearing problems? For example, do you need new glasses or hearing aids?    Do you have 2 or more long-lasting (chronic) medical conditions?    Do you take 3 or more medicines?    Have you felt depressed recently?    Have you had more trouble with your memory in recent months?    Are there hazards in your home that might cause you to fall, such as loose rugs or poor lighting?    Do you have a pet that jumps on you or might trip you?    Have you stopped getting regular exercise?    Do you have diabetes?     Do you have a neurologic disease, such as Parkinson or Alzheimer disease?     Do you drink alcohol?    Do you wear athletic shoes or slippers, or go barefoot at home?  You can help prevent falls  If you answered \"yes\" " to any of the above questions, take steps to reduce your risk of a fall. Monitoring health conditions and keeping walkways in your home free of clutter are just two ways. Changing is sometimes easier said than done. But keep in mind that even small changes can make you less likely to fall.  The fear of falling  It's normal to be scared of falling, especially if you've fallen before. But being afraid can actually make you more likely to fall. This is because:    Fear might cause you to become less active. Being less active can lead to a loss of strength and balance.    Fear can lead to isolation from others, depression, or the use of more medicines or alcohol. And all these things make falling even more likely.  To break the cycle, learn more about ways to avoid falling. As you take control, you may find yourself feeling less afraid.   Date Last Reviewed: 5/1/2017 2000-2017 [employer]. [address] [place] [address]. All rights reserved. This information is not intended as a substitute for professional medical care. Always follow your healthcare professional's instructions.      Discharge Instructions  Laceration (Cut)    You were seen today for a laceration (cut).  Your doctor examined your laceration for any problems such a buried foreign body (like glass, a splinter, or gravel), or injury to blood vessels, tendons, and nerves.  Your doctor may have also rinsed and/or scrubbed your laceration to help prevent an infection.  Your laceration may have been closed with glue, staples or sutures (stitches).      It may not be possible to find all problems with your laceration on the first visit, and we can't always prevent infections.  Antibiotics are only given when the benefit is more than the risk, and don't prevent all infections. Some lacerations are too high risk to close, and are left open to heal.  All lacerations, no matter how expertly repaired, will cause scarring.    Return to the  Emergency Department right away if:    You have more redness, swelling, pain, drainage (pus), a bad smell, or red streaking from your laceration.      You have a fever of 101oF or more.    You have bleeding that you can t stop at home. If your cut starts to bleed, hold pressure on the bleeding area with a clean cloth or put pressure over the bandage.  If the bleeding doesn t stop after using constant pressure for 30 minutes, you should return to the Emergency Department for further treatment.    An area past the laceration is cool, pale, or blue compared with the other side, or has a slower return of color when squeezed.    Your dressing seems too tight or starts to get uncomfortable or painful.    You have loss of normal function or use of an area, such as being unable to straighten or bend a finger normally.    You have a numb area past the laceration.    Return to the Emergency Department or see your regular doctor if:    The laceration starts to come open.     You have something coming out of the cut or a feeling that there is something in the laceration.    Your wound will not heal, or keeps breaking open. There can always be glass, wood, dirt or other things in any wound.  They won t always show up, even on x-rays.  If a wound doesn t heal, this may be why, and it is important to follow-up with your regular doctor.    Home Care:    Take your dressing off in 12 hours, or as instructed by your doctor, to check your laceration. Remove the dressing sooner if it seems too tight or painful, or if it is getting numb, tingly, or pale past the dressing.    Gently wash your laceration 2 times a day with clean cloth and soap.     It is okay to shower, but do not let the laceration soak in water.      If your laceration was closed with wound adhesive or strips: pat it dry and leave it open to the air.     For all other repairs: after you wash your laceration, or at least 2 times a day, apply bacitracin or other antibiotic  "ointment to the laceration, then cover it with a Band-Aid  or gauze.    Keep the laceration clean. Wear gloves or other protective clothing if you are around dirt.    Follow-up:    You need to follow-up with your regular doctor in 2-3 days.    Scars:  To help minimize scarring:    Wear sunscreen over the healed laceration when out in the sun.    Massage the area regularly.    You may use Vitamin E oil.    Wait a year.  Most scars will start to fade within a year.    Probiotics: If you have been given an antibiotic, you may want to also take a probiotic pill or eat yogurt with live cultures. Probiotics have \"good bacteria\" to help your intestines stay healthy. Studies have shown that probiotics help prevent diarrhea and other intestine problems (including C. diff infection) when you take antibiotics. You can buy these without a prescription in the pharmacy section of the store.     If you were given a prescription for medicine here today, be sure to read all of the information (including the package insert) that comes with your prescription.  This will include important information about the medicine, its side effects, and any warnings that you need to know about.  The pharmacist who fills the prescription can provide more information and answer questions you may have about the medicine.  If you have questions or concerns that the pharmacist cannot address, please call or return to the Emergency Department.     Opioid Medication Information    Pain medications are among the most commonly prescribed medicines, so we are including this information for all our patients. If you did not receive pain medication or get a prescription for pain medicine, you can ignore it.     You may have been given a prescription for an opioid (narcotic) pain medicine and/or have received a pain medicine while here in the Emergency Department. These medicines can make you drowsy or impaired. You must not drive, operate dangerous equipment, " or engage in any other dangerous activities while taking these medications. If you drive while taking these medications, you could be arrested for DUI, or driving under the influence. Do not drink any alcohol while you are taking these medications.     Opioid pain medications can cause addiction. If you have a history of chemical dependency of any type, you are at a higher risk of becoming addicted to pain medications.  Only take these prescribed medications to treat your pain when all other options have been tried. Take it for as short a time and as few doses as possible. Store your pain pills in a secure place, as they are frequently stolen and provide a dangerous opportunity for children or visitors in your house to start abusing these powerful medications. We will not replace any lost or stolen medicine.  As soon as your pain is better, you should flush all your remaining medication.     Many prescription pain medications contain Tylenol  (acetaminophen), including Vicodin , Tylenol #3 , Norco , Lortab , and Percocet .  You should not take any extra pills of Tylenol  if you are using these prescription medications or you can get very sick.  Do not ever take more than 3000 mg of acetaminophen in any 24 hour period.    All opioids tend to cause constipation. Drink plenty of water and eat foods that have a lot of fiber, such as fruits, vegetables, prune juice, apple juice and high fiber cereal.  Take a laxative if you don t move your bowels at least every other day. Miralax , Milk of Magnesia, Colace , or Senna  can be used to keep you regular.      Remember that you can always come back to the Emergency Department if you are not able to see your regular doctor in the amount of time listed above, if you get any new symptoms, or if there is anything that worries you.

## 2018-05-17 ENCOUNTER — TELEPHONE (OUTPATIENT)
Dept: NEUROLOGY | Facility: CLINIC | Age: 68
End: 2018-05-17

## 2018-05-17 NOTE — TELEPHONE ENCOUNTER
[facility] Call Center    Phone Message    May a detailed message be left on voicemail: yes    Reason for Call: Other: Pt needs to schedule annual follow up appointment with Dr Verde; last seen on 10/6/17. Pt was interested in scheduling for sometime in September.     Action Taken: Message routed to:  [facility] Neurology

## 2018-05-22 NOTE — TELEPHONE ENCOUNTER
Contacted patient and she made an appointment for Sept 14 at 10:30 with Dr. Verde. She said she doesn't remember how to get here so a map will be sent to her.

## 2018-09-11 ASSESSMENT — ENCOUNTER SYMPTOMS
SINUS PAIN: 0
EYE REDNESS: 0
EYE PAIN: 0
SPEECH CHANGE: 1
NUMBNESS: 0
SMELL DISTURBANCE: 0
MEMORY LOSS: 0
HOARSE VOICE: 0
WEAKNESS: 0
BLOOD IN STOOL: 0
DISTURBANCES IN COORDINATION: 1
TREMORS: 0
DOUBLE VISION: 0
NECK MASS: 0
TASTE DISTURBANCE: 0
RECTAL PAIN: 0
EYE WATERING: 1
JAUNDICE: 0
LOSS OF CONSCIOUSNESS: 0
BOWEL INCONTINENCE: 0
DIZZINESS: 1
BLOATING: 0
ABDOMINAL PAIN: 0
PARALYSIS: 0
HEADACHES: 0
VOMITING: 0
SORE THROAT: 0
CONSTIPATION: 0
EYE IRRITATION: 0
NAUSEA: 0
SEIZURES: 0
DIARRHEA: 0
SINUS CONGESTION: 0
TINGLING: 0
TROUBLE SWALLOWING: 0
HEARTBURN: 1

## 2018-09-13 ENCOUNTER — PATIENT OUTREACH (OUTPATIENT)
Dept: CARE COORDINATION | Facility: CLINIC | Age: 68
End: 2018-09-13

## 2018-09-14 ENCOUNTER — OFFICE VISIT (OUTPATIENT)
Dept: NEUROLOGY | Facility: CLINIC | Age: 68
End: 2018-09-14
Payer: MEDICARE

## 2018-09-14 VITALS
WEIGHT: 181.8 LBS | RESPIRATION RATE: 17 BRPM | OXYGEN SATURATION: 93 % | BODY MASS INDEX: 28.53 KG/M2 | DIASTOLIC BLOOD PRESSURE: 79 MMHG | SYSTOLIC BLOOD PRESSURE: 121 MMHG | TEMPERATURE: 97.7 F | HEIGHT: 67 IN | HEART RATE: 71 BPM

## 2018-09-14 DIAGNOSIS — R27.0 ATAXIA: Primary | ICD-10-CM

## 2018-09-14 PROBLEM — G11.9 CEREBELLAR ATAXIA (H): Status: ACTIVE | Noted: 2018-02-13

## 2018-09-14 ASSESSMENT — PAIN SCALES - GENERAL: PAINLEVEL: NO PAIN (0)

## 2018-09-14 NOTE — MR AVS SNAPSHOT
"              After Visit Summary   9/14/2018    Anni Valverde    MRN: [id number]           Patient Information     Date Of Birth          1950        Visit Information        Provider Department      9/14/2018 10:30 AM Areli Verde MD [facility] Neurology        Today's Diagnoses     Ataxia    -  1       Follow-ups after your visit        Who to contact     Please call your clinic at [phone] to:    Ask questions about your health    Make or cancel appointments    Discuss your medicines    Learn about your test results    Speak to your doctor            Additional Information About Your Visit        MyChart Information     [employer] gives you secure access to your electronic health record. If you see a primary care provider, you can also send messages to your care team and make appointments. If you have questions, please call your primary care clinic.  If you do not have a primary care provider, please call [phone] and they will assist you.      MailPix is an electronic gateway that provides easy, online access to your medical records. With Mail[employer], you can request a clinic appointment, read your test results, renew a prescription or communicate with your care team.     To access your existing account, please contact your [facility] or call [phone] for assistance.        Care EveryWhere ID     This is your Care EveryWhere ID. This could be used by other organizations to access your Home medical records  [id number]        Your Vitals Were     Pulse Temperature Respirations Height Pulse Oximetry Breastfeeding?    71 97.7  F (36.5  C) (Oral) 17 1.702 m (5' 7\") 93% No    BMI (Body Mass Index)                   28.47 kg/m2            Blood Pressure from Last 3 Encounters:   09/14/18 121/79   04/22/18 150/69   10/06/17 143/76    Weight from Last 3 Encounters:   09/14/18 82.5 kg (181 lb 12.8 oz)   04/22/18 77.1 kg (170 lb)   10/06/17 79.7 kg (175 lb " 12.8 oz)              Today, you had the following     No orders found for display       Primary Care Provider Office Phone # Fax #    Brendon Morgan [phone]       [facility] [address] [place] [address]        Equal Access to Services     LUANA DELGADO : Hadii aad ku hadasho Soomaali, waaxda luqadaha, qaybta kaalmada adeegyada, waxay dariain hayaan adejulito horneviolajennifer albarran. So [facility] [phone].    ATENCIÓN: Si habla español, tiene a pendleton disposición servicios gratuitos de asistencia lingüística. [facility] [phone].    We comply with applicable federal civil rights laws and [place] laws. We do not discriminate on the basis of race, color, national origin, age, disability, sex, sexual orientation, or gender identity.            Thank you!     Thank you for choosing [facility] NEUROLOGY  for your care. Our goal is always to provide you with excellent care. Hearing back from our patients is one way we can continue to improve our services. Please take a few minutes to complete the written survey that you may receive in the mail after your visit with us. Thank you!             Your Updated Medication List - Protect others around you: Learn how to safely use, store and throw away your medicines at www.disposemymeds.org.          This list is accurate as of 9/14/18 11:59 PM.  Always use your most recent med list.                   Brand Name Dispense Instructions for use Diagnosis    acetaminophen 500 MG tablet    TYLENOL     Take 500 mg by mouth every 6 hours as needed        aspirin-acetaminophen-caffeine 250-250-65 MG per tablet    EXCEDRIN MIGRAINE     Take 1 tablet by mouth as needed        calcium carbonate 600 mg-vitamin D 400 units 600-400 MG-UNIT per tablet    CALTRATE     Take 1 tablet by mouth 2 times daily        citalopram 10 MG tablet    celeXA     Take 30 mg by mouth daily        fluocinolone acetonide 0.01 % oil      Apply 1 Application topically as needed         levothyroxine 75 MCG tablet    SYNTHROID/LEVOTHROID     Take 75 mcg by mouth daily        LORazepam 0.5 MG tablet    ATIVAN     Take 1 tablet by mouth as needed        polyethylene glycol Packet    MIRALAX/GLYCOLAX     Take 17 g by mouth as needed        RANITIDINE HCL PO      150 mg 2 times daily

## 2018-09-14 NOTE — NURSING NOTE
Chief Complaint   Patient presents with     RECHECK     p return patient visit for ataxia        Carlyle Bee MA

## 2018-09-14 NOTE — LETTER
2018       RE: Anni Valverde  [address]  [place] [address]     Dear Colleague,    Thank you for referring your patient, Anni Valverde, to the [facility]. Please see a copy of my visit note below.    Service Date: 2018      HISTORY OF PRESENT ILLNESS:  Ms. Valverde returned for followup today at the [facility].  I saw her about a year ago.  She does report progression of speech difficulties and is unsure if her gait and mobility have worsened.      PHYSICAL EXAMINATION:  She was able to stand with her feet together for more than 10 seconds.  Gait was ataxic.  She could not perform or  tandem.  Speech was dysarthric, perhaps slightly worse than her prior visit.        She again met with Colin Burns today regarding her diagnosis, and she is not interested in pursuing further workup at this stage.  I believe she has not undergone next-generation sequencing yet.  I will see her back in the clinic as planned.         GRICEL ISIDRO MD          D: 09/15/2018   T: 2018   MT: curt      Name:     ANNI VALVERDE   MRN:      [id number]        Account:      [id number]   :      1950           Service Date: 2018      Document: [id number]

## 2018-09-16 NOTE — PROGRESS NOTES
Service Date: 2018      HISTORY OF PRESENT ILLNESS:  Ms. Valverde returned for followup today at the [facility].  I saw her about a year ago.  She does report progression of speech difficulties and is unsure if her gait and mobility have worsened.      PHYSICAL EXAMINATION:  She was able to stand with her feet together for more than 10 seconds.  Gait was ataxic.  She could not perform or  tandem.  Speech was dysarthric, perhaps slightly worse than her prior visit.        She again met with Colin Burns today regarding her diagnosis, and she is not interested in pursuing further workup at this stage.  I believe she has not undergone next-generation sequencing yet.  I will see her back in the clinic as planned.         GRICEL ISIDRO MD             D: 09/15/2018   T: 2018   MT: curt      Name:     MADDI VALVERDE   MRN:      [id number]        Account:      [id number]   :      1950           Service Date: 2018      Document: [id number]

## 2019-02-13 ENCOUNTER — MYC MEDICAL ADVICE (OUTPATIENT)
Dept: NEUROLOGY | Facility: CLINIC | Age: 69
End: 2019-02-13

## 2019-04-16 ENCOUNTER — MYC MEDICAL ADVICE (OUTPATIENT)
Dept: NEUROLOGY | Facility: CLINIC | Age: 69
End: 2019-04-16

## 2019-04-29 NOTE — TELEPHONE ENCOUNTER
[facility] Call Center    Phone Message    May a detailed message be left on voicemail: yes    Reason for Call: Other: Ron calling to follow up on the "[employer] messages he left ini Feb and 4/16.  Please call him back to discuss any medication options to help Anni     Action Taken: Message routed to:  [facility]  Neurology     Called Abran, no answer, message was left.  Email sent to Dr. Verde with all information that  is asking about L-Serine. Asked Dr. Verde to call pt's .

## 2019-05-17 NOTE — TELEPHONE ENCOUNTER
Spoke to pt's  and discussed L-Serene and it's use in ataxia pt's. Dr. Verde said she can take it or not, there is no proven evidence that it does anything for ataxia patients. Dr. Verde's concern is cost. Unknown how much the med is per month. Pt's  said he would investigate the cost and call this writer back.

## 2019-05-17 NOTE — TELEPHONE ENCOUNTER
[facility] Call Center    Phone Message    May a detailed message be left on voicemail: yes    Reason for Call: Other: pt's  is calling to see is Areli Elias has got his letter and can he please respond. Pt's  doesn't care if its a letter being sent or if he gets a call back. He has been waiting since Feburary to hear back . He understands that Areli Elias was [place] until [place] 1. Please call pt's  back to discuss as soon as possible.     Action Taken: Message routed to:  [facility] neurology

## 2020-03-11 ENCOUNTER — HEALTH MAINTENANCE LETTER (OUTPATIENT)
Age: 70
End: 2020-03-11

## 2020-12-27 ENCOUNTER — HEALTH MAINTENANCE LETTER (OUTPATIENT)
Age: 70
End: 2020-12-27

## 2021-04-25 ENCOUNTER — HEALTH MAINTENANCE LETTER (OUTPATIENT)
Age: 71
End: 2021-04-25

## 2021-10-09 ENCOUNTER — HEALTH MAINTENANCE LETTER (OUTPATIENT)
Age: 71
End: 2021-10-09

## 2022-05-21 ENCOUNTER — HEALTH MAINTENANCE LETTER (OUTPATIENT)
Age: 72
End: 2022-05-21

## 2022-09-17 ENCOUNTER — HEALTH MAINTENANCE LETTER (OUTPATIENT)
Age: 72
End: 2022-09-17

## 2023-01-23 ENCOUNTER — HEALTH MAINTENANCE LETTER (OUTPATIENT)
Age: 73
End: 2023-01-23

## 2023-06-04 ENCOUNTER — HEALTH MAINTENANCE LETTER (OUTPATIENT)
Age: 73
End: 2023-06-04

## 2024-12-27 ENCOUNTER — HOSPITAL ENCOUNTER (INPATIENT)
Facility: CLINIC | Age: 74
DRG: 175 | End: 2024-12-27
Attending: EMERGENCY MEDICINE | Admitting: FAMILY MEDICINE
Payer: MEDICARE

## 2024-12-27 ENCOUNTER — APPOINTMENT (OUTPATIENT)
Dept: GENERAL RADIOLOGY | Facility: CLINIC | Age: 74
DRG: 175 | End: 2024-12-27
Attending: EMERGENCY MEDICINE
Payer: MEDICARE

## 2024-12-27 ENCOUNTER — APPOINTMENT (OUTPATIENT)
Dept: CARDIOLOGY | Facility: CLINIC | Age: 74
DRG: 175 | End: 2024-12-27
Attending: EMERGENCY MEDICINE
Payer: MEDICARE

## 2024-12-27 ENCOUNTER — APPOINTMENT (OUTPATIENT)
Dept: CT IMAGING | Facility: CLINIC | Age: 74
DRG: 175 | End: 2024-12-27
Attending: EMERGENCY MEDICINE
Payer: MEDICARE

## 2024-12-27 DIAGNOSIS — I26.99 BILATERAL PULMONARY EMBOLISM (H): ICD-10-CM

## 2024-12-27 DIAGNOSIS — I26.94 MULTIPLE SUBSEGMENTAL PULMONARY EMBOLI WITHOUT ACUTE COR PULMONALE (H): ICD-10-CM

## 2024-12-27 DIAGNOSIS — G90.3 MULTIPLE SYSTEM ATROPHY (H): ICD-10-CM

## 2024-12-27 DIAGNOSIS — G23.8 MULTIPLE SYSTEM ATROPHY (H): ICD-10-CM

## 2024-12-27 DIAGNOSIS — J96.01 ACUTE RESPIRATORY FAILURE WITH HYPOXIA (H): Primary | ICD-10-CM

## 2024-12-27 DIAGNOSIS — R09.02 HYPOXIA: ICD-10-CM

## 2024-12-27 PROBLEM — N31.9 NEUROGENIC BLADDER: Status: ACTIVE | Noted: 2024-12-27

## 2024-12-27 PROBLEM — I95.1 ORTHOSTATIC HYPOTENSION: Status: ACTIVE | Noted: 2024-12-27

## 2024-12-27 PROBLEM — N13.30 HYDRONEPHROSIS OF LEFT KIDNEY: Status: ACTIVE | Noted: 2024-12-27

## 2024-12-27 PROBLEM — Q62.5 DUPLICATED URETER, LEFT: Status: ACTIVE | Noted: 2024-12-27

## 2024-12-27 PROBLEM — K21.9 GASTROESOPHAGEAL REFLUX DISEASE WITHOUT ESOPHAGITIS: Status: ACTIVE | Noted: 2024-12-27

## 2024-12-27 PROBLEM — F41.9 ANXIETY: Status: ACTIVE | Noted: 2024-12-27

## 2024-12-27 PROBLEM — Z97.8 CHRONIC INDWELLING FOLEY CATHETER: Status: ACTIVE | Noted: 2024-12-27

## 2024-12-27 LAB
ALBUMIN SERPL BCG-MCNC: 3.8 G/DL (ref 3.5–5.2)
ALBUMIN UR-MCNC: 10 MG/DL
ALP SERPL-CCNC: 107 U/L (ref 40–150)
ALT SERPL W P-5'-P-CCNC: 22 U/L (ref 0–50)
ANION GAP SERPL CALCULATED.3IONS-SCNC: 13 MMOL/L (ref 7–15)
APPEARANCE UR: CLEAR
AST SERPL W P-5'-P-CCNC: 35 U/L (ref 0–45)
ATRIAL RATE - MUSE: 84 BPM
BACTERIA #/AREA URNS HPF: ABNORMAL /HPF
BASE EXCESS BLDV CALC-SCNC: -0.3 MMOL/L (ref -3–3)
BASE EXCESS BLDV CALC-SCNC: 0.6 MMOL/L (ref -3–3)
BASOPHILS # BLD AUTO: 0 10E3/UL (ref 0–0.2)
BASOPHILS NFR BLD AUTO: 1 %
BILIRUB SERPL-MCNC: 1.1 MG/DL
BILIRUB UR QL STRIP: NEGATIVE
BUN SERPL-MCNC: 21.5 MG/DL (ref 8–23)
CALCIUM SERPL-MCNC: 9.5 MG/DL (ref 8.8–10.4)
CHLORIDE SERPL-SCNC: 104 MMOL/L (ref 98–107)
COLOR UR AUTO: YELLOW
CREAT SERPL-MCNC: 0.95 MG/DL (ref 0.51–0.95)
D DIMER PPP FEU-MCNC: 0.96 UG/ML FEU (ref 0–0.5)
DIASTOLIC BLOOD PRESSURE - MUSE: NORMAL MMHG
EGFRCR SERPLBLD CKD-EPI 2021: 63 ML/MIN/1.73M2
EOSINOPHIL # BLD AUTO: 0 10E3/UL (ref 0–0.7)
EOSINOPHIL NFR BLD AUTO: 0 %
ERYTHROCYTE [DISTWIDTH] IN BLOOD BY AUTOMATED COUNT: 12.3 % (ref 10–15)
ERYTHROCYTE [DISTWIDTH] IN BLOOD BY AUTOMATED COUNT: 12.3 % (ref 10–15)
FLUAV RNA SPEC QL NAA+PROBE: NEGATIVE
FLUBV RNA RESP QL NAA+PROBE: NEGATIVE
GLUCOSE SERPL-MCNC: 111 MG/DL (ref 70–99)
GLUCOSE UR STRIP-MCNC: NEGATIVE MG/DL
HCO3 BLDV-SCNC: 24 MMOL/L (ref 21–28)
HCO3 BLDV-SCNC: 25 MMOL/L (ref 21–28)
HCO3 SERPL-SCNC: 21 MMOL/L (ref 22–29)
HCT VFR BLD AUTO: 33.1 % (ref 35–47)
HCT VFR BLD AUTO: 35.3 % (ref 35–47)
HGB BLD-MCNC: 11.5 G/DL (ref 11.7–15.7)
HGB BLD-MCNC: 12.2 G/DL (ref 11.7–15.7)
HGB UR QL STRIP: ABNORMAL
HOLD SPECIMEN: NORMAL
IMM GRANULOCYTES # BLD: 0 10E3/UL
IMM GRANULOCYTES NFR BLD: 0 %
INTERPRETATION ECG - MUSE: NORMAL
KETONES UR STRIP-MCNC: 40 MG/DL
LACTATE SERPL-SCNC: 0.6 MMOL/L (ref 0.7–2)
LEUKOCYTE ESTERASE UR QL STRIP: ABNORMAL
LVEF ECHO: NORMAL
LYMPHOCYTES # BLD AUTO: 0.5 10E3/UL (ref 0.8–5.3)
LYMPHOCYTES NFR BLD AUTO: 8 %
MCH RBC QN AUTO: 30.7 PG (ref 26.5–33)
MCH RBC QN AUTO: 30.7 PG (ref 26.5–33)
MCHC RBC AUTO-ENTMCNC: 34.6 G/DL (ref 31.5–36.5)
MCHC RBC AUTO-ENTMCNC: 34.7 G/DL (ref 31.5–36.5)
MCV RBC AUTO: 89 FL (ref 78–100)
MCV RBC AUTO: 89 FL (ref 78–100)
MONOCYTES # BLD AUTO: 0.8 10E3/UL (ref 0–1.3)
MONOCYTES NFR BLD AUTO: 12 %
MUCOUS THREADS #/AREA URNS LPF: PRESENT /LPF
NEUTROPHILS # BLD AUTO: 5.4 10E3/UL (ref 1.6–8.3)
NEUTROPHILS NFR BLD AUTO: 80 %
NITRATE UR QL: NEGATIVE
NRBC # BLD AUTO: 0 10E3/UL
NRBC BLD AUTO-RTO: 0 /100
NT-PROBNP SERPL-MCNC: 139 PG/ML (ref 0–900)
O2/TOTAL GAS SETTING VFR VENT: 80 %
O2/TOTAL GAS SETTING VFR VENT: 85 %
OXYHGB MFR BLDV: 85 % (ref 70–75)
OXYHGB MFR BLDV: 94 % (ref 70–75)
P AXIS - MUSE: 46 DEGREES
PCO2 BLDV: 35 MM HG (ref 40–50)
PCO2 BLDV: 36 MM HG (ref 40–50)
PH BLDV: 7.44 [PH] (ref 7.32–7.43)
PH BLDV: 7.44 [PH] (ref 7.32–7.43)
PH UR STRIP: 6.5 [PH] (ref 5–7)
PLATELET # BLD AUTO: 190 10E3/UL (ref 150–450)
PLATELET # BLD AUTO: 198 10E3/UL (ref 150–450)
PO2 BLDV: 51 MM HG (ref 25–47)
PO2 BLDV: 71 MM HG (ref 25–47)
POTASSIUM SERPL-SCNC: 3.9 MMOL/L (ref 3.4–5.3)
PR INTERVAL - MUSE: 138 MS
PROCALCITONIN SERPL IA-MCNC: 0.08 NG/ML
PROT SERPL-MCNC: 6.9 G/DL (ref 6.4–8.3)
QRS DURATION - MUSE: 72 MS
QT - MUSE: 354 MS
QTC - MUSE: 418 MS
R AXIS - MUSE: -10 DEGREES
RADIOLOGIST FLAGS: ABNORMAL
RBC # BLD AUTO: 3.74 10E6/UL (ref 3.8–5.2)
RBC # BLD AUTO: 3.98 10E6/UL (ref 3.8–5.2)
RBC URINE: <1 /HPF
RSV RNA SPEC NAA+PROBE: NEGATIVE
SAO2 % BLDV: 86.5 % (ref 70–75)
SAO2 % BLDV: 95.6 % (ref 70–75)
SARS-COV-2 RNA RESP QL NAA+PROBE: NEGATIVE
SODIUM SERPL-SCNC: 138 MMOL/L (ref 135–145)
SP GR UR STRIP: 1.02 (ref 1–1.03)
SQUAMOUS EPITHELIAL: 1 /HPF
SYSTOLIC BLOOD PRESSURE - MUSE: NORMAL MMHG
T AXIS - MUSE: -1 DEGREES
TROPONIN T SERPL HS-MCNC: 105 NG/L
TROPONIN T SERPL HS-MCNC: 57 NG/L
TROPONIN T SERPL HS-MCNC: 61 NG/L
TROPONIN T SERPL HS-MCNC: 70 NG/L
UFH PPP CHRO-ACNC: >1.1 IU/ML
UROBILINOGEN UR STRIP-MCNC: 3 MG/DL
VENTRICULAR RATE- MUSE: 84 BPM
WBC # BLD AUTO: 6.7 10E3/UL (ref 4–11)
WBC # BLD AUTO: 6.8 10E3/UL (ref 4–11)
WBC URINE: 20 /HPF

## 2024-12-27 PROCEDURE — 99285 EMERGENCY DEPT VISIT HI MDM: CPT | Mod: 25

## 2024-12-27 PROCEDURE — 87637 SARSCOV2&INF A&B&RSV AMP PRB: CPT | Performed by: EMERGENCY MEDICINE

## 2024-12-27 PROCEDURE — 82310 ASSAY OF CALCIUM: CPT | Performed by: EMERGENCY MEDICINE

## 2024-12-27 PROCEDURE — 93306 TTE W/DOPPLER COMPLETE: CPT | Mod: 26 | Performed by: INTERNAL MEDICINE

## 2024-12-27 PROCEDURE — 999N000157 HC STATISTIC RCP TIME EA 10 MIN

## 2024-12-27 PROCEDURE — 84484 ASSAY OF TROPONIN QUANT: CPT | Performed by: EMERGENCY MEDICINE

## 2024-12-27 PROCEDURE — 999N000215 HC STATISTIC HFNC ADULT NON-CPAP

## 2024-12-27 PROCEDURE — 85014 HEMATOCRIT: CPT | Performed by: FAMILY MEDICINE

## 2024-12-27 PROCEDURE — 83880 ASSAY OF NATRIURETIC PEPTIDE: CPT | Performed by: EMERGENCY MEDICINE

## 2024-12-27 PROCEDURE — 85379 FIBRIN DEGRADATION QUANT: CPT | Performed by: EMERGENCY MEDICINE

## 2024-12-27 PROCEDURE — 36415 COLL VENOUS BLD VENIPUNCTURE: CPT | Performed by: FAMILY MEDICINE

## 2024-12-27 PROCEDURE — 82805 BLOOD GASES W/O2 SATURATION: CPT | Performed by: EMERGENCY MEDICINE

## 2024-12-27 PROCEDURE — 81001 URINALYSIS AUTO W/SCOPE: CPT | Performed by: EMERGENCY MEDICINE

## 2024-12-27 PROCEDURE — 99223 1ST HOSP IP/OBS HIGH 75: CPT | Mod: AI | Performed by: FAMILY MEDICINE

## 2024-12-27 PROCEDURE — 71045 X-RAY EXAM CHEST 1 VIEW: CPT

## 2024-12-27 PROCEDURE — 85520 HEPARIN ASSAY: CPT | Performed by: FAMILY MEDICINE

## 2024-12-27 PROCEDURE — 250N000011 HC RX IP 250 OP 636: Performed by: EMERGENCY MEDICINE

## 2024-12-27 PROCEDURE — 85025 COMPLETE CBC W/AUTO DIFF WBC: CPT | Performed by: EMERGENCY MEDICINE

## 2024-12-27 PROCEDURE — 84145 PROCALCITONIN (PCT): CPT | Performed by: EMERGENCY MEDICINE

## 2024-12-27 PROCEDURE — 93306 TTE W/DOPPLER COMPLETE: CPT

## 2024-12-27 PROCEDURE — 85018 HEMOGLOBIN: CPT | Performed by: EMERGENCY MEDICINE

## 2024-12-27 PROCEDURE — 93005 ELECTROCARDIOGRAM TRACING: CPT

## 2024-12-27 PROCEDURE — G1010 CDSM STANSON: HCPCS

## 2024-12-27 PROCEDURE — 84484 ASSAY OF TROPONIN QUANT: CPT | Performed by: FAMILY MEDICINE

## 2024-12-27 PROCEDURE — 93010 ELECTROCARDIOGRAM REPORT: CPT | Performed by: EMERGENCY MEDICINE

## 2024-12-27 PROCEDURE — 120N000013 HC R&B IMCU

## 2024-12-27 PROCEDURE — 99285 EMERGENCY DEPT VISIT HI MDM: CPT | Mod: 25 | Performed by: EMERGENCY MEDICINE

## 2024-12-27 PROCEDURE — 83605 ASSAY OF LACTIC ACID: CPT | Performed by: EMERGENCY MEDICINE

## 2024-12-27 PROCEDURE — 250N000009 HC RX 250: Performed by: EMERGENCY MEDICINE

## 2024-12-27 PROCEDURE — 87186 SC STD MICRODIL/AGAR DIL: CPT | Performed by: EMERGENCY MEDICINE

## 2024-12-27 PROCEDURE — 250N000013 HC RX MED GY IP 250 OP 250 PS 637: Performed by: FAMILY MEDICINE

## 2024-12-27 PROCEDURE — 99418 PROLNG IP/OBS E/M EA 15 MIN: CPT | Performed by: FAMILY MEDICINE

## 2024-12-27 PROCEDURE — 272N000054 HC CANNULA HIGH FLOW, ADULT

## 2024-12-27 PROCEDURE — 36415 COLL VENOUS BLD VENIPUNCTURE: CPT | Performed by: EMERGENCY MEDICINE

## 2024-12-27 PROCEDURE — 82805 BLOOD GASES W/O2 SATURATION: CPT | Performed by: FAMILY MEDICINE

## 2024-12-27 RX ORDER — ANASTROZOLE 1 MG/1
1 TABLET ORAL DAILY
Status: DISCONTINUED | OUTPATIENT
Start: 2024-12-27 | End: 2024-12-28

## 2024-12-27 RX ORDER — NITROFURANTOIN 25; 75 MG/1; MG/1
100 CAPSULE ORAL DAILY
COMMUNITY
Start: 2024-10-18

## 2024-12-27 RX ORDER — OXYBUTYNIN CHLORIDE 5 MG/1
5 TABLET, EXTENDED RELEASE ORAL
COMMUNITY
Start: 2024-03-05

## 2024-12-27 RX ORDER — NITROFURANTOIN 25; 75 MG/1; MG/1
100 CAPSULE ORAL DAILY
Status: DISCONTINUED | OUTPATIENT
Start: 2024-12-27 | End: 2024-12-28

## 2024-12-27 RX ORDER — LEVOTHYROXINE SODIUM 112 UG/1
112 TABLET ORAL
COMMUNITY

## 2024-12-27 RX ORDER — HEPARIN SODIUM 10000 [USP'U]/100ML
0-5000 INJECTION, SOLUTION INTRAVENOUS CONTINUOUS
Status: DISCONTINUED | OUTPATIENT
Start: 2024-12-27 | End: 2024-12-30

## 2024-12-27 RX ORDER — POLYETHYLENE GLYCOL 3350 17 G/17G
17 POWDER, FOR SOLUTION ORAL DAILY
Status: DISCONTINUED | OUTPATIENT
Start: 2024-12-27 | End: 2025-01-05 | Stop reason: HOSPADM

## 2024-12-27 RX ORDER — MIRABEGRON 25 MG/1
25 TABLET, FILM COATED, EXTENDED RELEASE ORAL DAILY
Status: DISCONTINUED | OUTPATIENT
Start: 2024-12-27 | End: 2025-01-05 | Stop reason: HOSPADM

## 2024-12-27 RX ORDER — CALCIUM CARBONATE 500 MG/1
1000 TABLET, CHEWABLE ORAL 4 TIMES DAILY PRN
Status: DISCONTINUED | OUTPATIENT
Start: 2024-12-27 | End: 2025-01-05 | Stop reason: HOSPADM

## 2024-12-27 RX ORDER — IOPAMIDOL 755 MG/ML
500 INJECTION, SOLUTION INTRAVASCULAR ONCE
Status: COMPLETED | OUTPATIENT
Start: 2024-12-27 | End: 2024-12-27

## 2024-12-27 RX ORDER — OXYBUTYNIN CHLORIDE 5 MG/1
5 TABLET, EXTENDED RELEASE ORAL
Status: DISCONTINUED | OUTPATIENT
Start: 2024-12-27 | End: 2025-01-05 | Stop reason: HOSPADM

## 2024-12-27 RX ORDER — ONDANSETRON 2 MG/ML
4 INJECTION INTRAMUSCULAR; INTRAVENOUS EVERY 6 HOURS PRN
Status: DISCONTINUED | OUTPATIENT
Start: 2024-12-27 | End: 2025-01-05 | Stop reason: HOSPADM

## 2024-12-27 RX ORDER — OMEPRAZOLE 40 MG/1
40 CAPSULE, DELAYED RELEASE ORAL EVERY EVENING
COMMUNITY
Start: 2024-04-15

## 2024-12-27 RX ORDER — CITALOPRAM HYDROBROMIDE 20 MG/1
20 TABLET ORAL AT BEDTIME
COMMUNITY

## 2024-12-27 RX ORDER — PANTOPRAZOLE SODIUM 40 MG/1
40 TABLET, DELAYED RELEASE ORAL
Status: DISCONTINUED | OUTPATIENT
Start: 2024-12-27 | End: 2025-01-05 | Stop reason: HOSPADM

## 2024-12-27 RX ORDER — LEVOTHYROXINE SODIUM 112 UG/1
112 TABLET ORAL
Status: DISCONTINUED | OUTPATIENT
Start: 2024-12-28 | End: 2025-01-05 | Stop reason: HOSPADM

## 2024-12-27 RX ORDER — AMOXICILLIN 250 MG
2 CAPSULE ORAL 2 TIMES DAILY PRN
Status: DISCONTINUED | OUTPATIENT
Start: 2024-12-27 | End: 2025-01-05 | Stop reason: HOSPADM

## 2024-12-27 RX ORDER — MIRABEGRON 25 MG/1
1 TABLET, FILM COATED, EXTENDED RELEASE ORAL DAILY
COMMUNITY
Start: 2024-08-16 | End: 2025-08-16

## 2024-12-27 RX ORDER — LIDOCAINE 40 MG/G
CREAM TOPICAL
Status: DISCONTINUED | OUTPATIENT
Start: 2024-12-27 | End: 2025-01-05 | Stop reason: HOSPADM

## 2024-12-27 RX ORDER — ONDANSETRON 4 MG/1
4 TABLET, ORALLY DISINTEGRATING ORAL EVERY 6 HOURS PRN
Status: DISCONTINUED | OUTPATIENT
Start: 2024-12-27 | End: 2025-01-05 | Stop reason: HOSPADM

## 2024-12-27 RX ORDER — AMOXICILLIN 250 MG
1 CAPSULE ORAL 2 TIMES DAILY PRN
Status: DISCONTINUED | OUTPATIENT
Start: 2024-12-27 | End: 2025-01-05 | Stop reason: HOSPADM

## 2024-12-27 RX ORDER — CITALOPRAM HYDROBROMIDE 20 MG/1
20 TABLET ORAL AT BEDTIME
Status: DISCONTINUED | OUTPATIENT
Start: 2024-12-27 | End: 2025-01-05 | Stop reason: HOSPADM

## 2024-12-27 RX ORDER — ANASTROZOLE 1 MG/1
1 TABLET ORAL DAILY
COMMUNITY
Start: 2024-10-07 | End: 2025-10-07

## 2024-12-27 RX ORDER — ACETAMINOPHEN 500 MG
500 TABLET ORAL EVERY 6 HOURS PRN
Status: DISCONTINUED | OUTPATIENT
Start: 2024-12-27 | End: 2025-01-05 | Stop reason: HOSPADM

## 2024-12-27 RX ORDER — PROCHLORPERAZINE MALEATE 5 MG/1
5 TABLET ORAL EVERY 6 HOURS PRN
Status: DISCONTINUED | OUTPATIENT
Start: 2024-12-27 | End: 2025-01-05 | Stop reason: HOSPADM

## 2024-12-27 RX ORDER — HEPARIN SODIUM 10000 [USP'U]/100ML
0-5000 INJECTION, SOLUTION INTRAVENOUS CONTINUOUS
Status: DISCONTINUED | OUTPATIENT
Start: 2024-12-27 | End: 2024-12-27

## 2024-12-27 RX ADMIN — POLYETHYLENE GLYCOL 3350 17 G: 17 POWDER, FOR SOLUTION ORAL at 18:08

## 2024-12-27 RX ADMIN — OXYBUTYNIN CHLORIDE 5 MG: 5 TABLET, EXTENDED RELEASE ORAL at 18:08

## 2024-12-27 RX ADMIN — CITALOPRAM HYDROBROMIDE 20 MG: 20 TABLET ORAL at 21:12

## 2024-12-27 RX ADMIN — HEPARIN SODIUM 1550 UNITS/HR: 10000 INJECTION, SOLUTION INTRAVENOUS at 14:34

## 2024-12-27 RX ADMIN — NITROFURANTOIN MONOHYDRATE/MACROCRYSTALLINE 100 MG: 25; 75 CAPSULE ORAL at 18:08

## 2024-12-27 RX ADMIN — IOPAMIDOL 64 ML: 755 INJECTION, SOLUTION INTRAVENOUS at 13:24

## 2024-12-27 RX ADMIN — MIRABEGRON 25 MG: 25 TABLET, FILM COATED, EXTENDED RELEASE ORAL at 18:08

## 2024-12-27 RX ADMIN — SODIUM CHLORIDE 70 ML: 9 INJECTION, SOLUTION INTRAVENOUS at 13:23

## 2024-12-27 RX ADMIN — PANTOPRAZOLE SODIUM 40 MG: 40 TABLET, DELAYED RELEASE ORAL at 18:08

## 2024-12-27 ASSESSMENT — ACTIVITIES OF DAILY LIVING (ADL)
CONCENTRATING,_REMEMBERING_OR_MAKING_DECISIONS_DIFFICULTY: YES
COMMUNICATION_MANAGEMENT: GARBLED SPEECH
ADLS_ACUITY_SCORE: 51
CHANGE_IN_FUNCTIONAL_STATUS_SINCE_ONSET_OF_CURRENT_ILLNESS/INJURY: YES
ADLS_ACUITY_SCORE: 55
ADLS_ACUITY_SCORE: 55
WEAR_GLASSES_OR_BLIND: YES
DIFFICULTY_COMMUNICATING: YES
ADLS_ACUITY_SCORE: 41
DRESSING/BATHING: DRESSING DIFFICULTY, DEPENDENT
ADLS_ACUITY_SCORE: 45
TOILETING_ISSUES: YES
HEARING_DIFFICULTY_OR_DEAF: NO
DIFFICULTY_EATING/SWALLOWING: NO
WALKING_OR_CLIMBING_STAIRS: TRANSFERRING DIFFICULTY, DEPENDENT
FALL_HISTORY_WITHIN_LAST_SIX_MONTHS: NO
ADLS_ACUITY_SCORE: 41
DOING_ERRANDS_INDEPENDENTLY_DIFFICULTY: YES
TOILETING_ASSISTANCE: TOILETING DIFFICULTY, DEPENDENT
EQUIPMENT_CURRENTLY_USED_AT_HOME: LIFT DEVICE
ADLS_ACUITY_SCORE: 41
TOILETING: 2-->COMPLETELY DEPENDENT
ADLS_ACUITY_SCORE: 51
TOILETING: 2-->COMPLETELY DEPENDENT (NOT DEVELOPMENTALLY APPROPRIATE)
ADLS_ACUITY_SCORE: 51
WALKING_OR_CLIMBING_STAIRS_DIFFICULTY: YES
ADLS_ACUITY_SCORE: 51
DRESSING/BATHING_DIFFICULTY: YES
ADLS_ACUITY_SCORE: 41

## 2024-12-27 ASSESSMENT — COLUMBIA-SUICIDE SEVERITY RATING SCALE - C-SSRS
1. IN THE PAST MONTH, HAVE YOU WISHED YOU WERE DEAD OR WISHED YOU COULD GO TO SLEEP AND NOT WAKE UP?: NO
2. HAVE YOU ACTUALLY HAD ANY THOUGHTS OF KILLING YOURSELF IN THE PAST MONTH?: NO
6. HAVE YOU EVER DONE ANYTHING, STARTED TO DO ANYTHING, OR PREPARED TO DO ANYTHING TO END YOUR LIFE?: NO

## 2024-12-27 ASSESSMENT — ENCOUNTER SYMPTOMS
PSYCHIATRIC NEGATIVE: 1
BRUISES/BLEEDS EASILY: 0
SHORTNESS OF BREATH: 1
ACTIVITY CHANGE: 1
FATIGUE: 1
FEVER: 0
APPETITE CHANGE: 0
ABDOMINAL PAIN: 0
PALPITATIONS: 0
SPEECH DIFFICULTY: 1
COUGH: 1
WEAKNESS: 1
WHEEZING: 1

## 2024-12-27 NOTE — ED TRIAGE NOTES
Brought in by EMS. Patient's  told EMS that she has been weaker than normal. Patient was brought in on 8L oxygen, she does not typically use oxygen. Patient has no complaints at this time. Denies any pain.      Triage Assessment (Adult)       Row Name 12/27/24 1010          Triage Assessment    Airway WDL WDL        Respiratory WDL    Respiratory WDL WDL        Skin Circulation/Temperature WDL    Skin Circulation/Temperature WDL WDL        Cardiac WDL    Cardiac WDL WDL        Peripheral/Neurovascular WDL    Peripheral Neurovascular WDL WDL        Cognitive/Neuro/Behavioral WDL    Cognitive/Neuro/Behavioral WDL WDL

## 2024-12-27 NOTE — PROGRESS NOTES
S-(situation): Patient arrives to room 215 via cart from ED    B-(background): Bilateral PE's, Weakness    A-(assessment): Pt is alert and oriented. Speech is garbled at baseline. Patient is total care due to neurological and autonomic disorders.  provides care full time with no additional services. Arrives on Heparin drip. Tamez replaced in ED.     R-(recommendations): Orders reviewed with patient and spouse. Will monitor patient per MD orders.     Inpatient nursing criteria listed below were met:    Health care directives status obtained and documented: Yes  VTE ordered/documented: Yes  Skin issues/needs documented:NA  Isolation addressed and Signage used: NA  Fall Prevention: Care plan updated Yes Education given and documented Yes  Care Plan initiated and Co-Morbidities added: Yes  Education Assessment documented:Yes  Admission Education Documented: Yes  If present CAUTI/CLABI Education done: Yes  New medication patient education completed and documented (Possible Side Effects of Common Medications handout): Yes  Allergies Reviewed: Yes  Admission Medication Reconciliation completed: Yes  Home medications if not able to send immediately home with family stored here: NA  Reminder note placed in discharge instructions regarding home meds: NA  Individualized care needs/preferences addressed and charted: Yes  Provider Notified that patient has arrived to the unit: Yes

## 2024-12-27 NOTE — LETTER
[facility] INTENSIVE CARE  [address] [place] [address]  Phone: [phone]    December 28, 2024        To whom it may concern:    Susannah Lopez had to cancel travel arrangements due to the unexpected acute hospitalization of her mother, Anni Valverde whom remains hospitalized.         Please contact me for questions or concerns.      Sincerely,      Ashley Lopez MD

## 2024-12-27 NOTE — LETTER
[facility] INTENSIVE CARE  [address] [place] [address]  Phone: [phone]    December 28, 2024        Anni Valverde  [address] [address] [place] [address]  [place] [address]          To whom it may concern:    RE: Anni Valverde    ***    Please contact me for questions or concerns.      Sincerely,      Ashley Lopez MD

## 2024-12-27 NOTE — MEDICATION SCRIBE - ADMISSION MEDICATION HISTORY
Medication Scribe Admission Medication History    Admission medication history is complete. The information provided in this note is only as accurate as the sources available at the time of the update.    Information Source(s): Patient, Family member, Hospital records, and CareEverywhere/SureScripts via in-person    Pertinent Information: Patient's spouse Abran present and helpful, brought AVS from [facility] visit on 10/9/24 and from that we pieced together med list (in EPIC up to date info could not be retrieved after multiple attempts).    Changes made to PTA medication list:  Added: omeprazole, oxybutynin, myrbetriq, anastrozole, voltaren gel, nitrofurantoin.  Deleted: fluocinolone oil, ranitidine  Changed: celexa from 10mg to 20mg, synthroid from 75mcg to 112mcg (LAURENCE).    Allergies reviewed with patient and updates made in EHR: yes    Medication History Completed By: LUIS MEDINA 12/27/2024 1:07 PM    PTA Med List   Medication Sig Last Dose/Taking    acetaminophen (TYLENOL) 500 MG tablet Take 500 mg by mouth every 6 hours as needed 12/27/2024 Morning    anastrozole (ARIMIDEX) 1 MG tablet Take 1 tablet by mouth daily. 12/26/2024 Morning    aspirin-acetaminophen-caffeine (EXCEDRIN MIGRAINE) 250-250-65 MG per tablet Take 1 tablet by mouth as needed 12/27/2024 Morning    calcium-vitamin D (CALTRATE) 600-400 MG-UNIT per tablet Take 1 tablet by mouth 2 times daily 12/26/2024 Bedtime    diclofenac (VOLTAREN) 1 % topical gel Place 2 g onto the skin 4 times daily as needed for moderate pain (bilateral knees). Past Month    LORazepam (ATIVAN) 0.5 MG tablet Take 1 tablet by mouth as needed More than a month    mirabegron (MYRBETRIQ) 25 MG 24 hr tablet Take 1 tablet by mouth daily. 12/26/2024 Morning    nitroFURantoin macrocrystal-monohydrate (MACROBID) 100 MG capsule Take 100 mg by mouth daily. prophy 12/26/2024 Morning    omeprazole (PRILOSEC) 40 MG DR capsule Take 40 mg by mouth every evening. One hour before supper  12/26/2024 Evening    oxyBUTYnin ER (DITROPAN XL) 5 MG 24 hr tablet Take 5 mg by mouth daily (before supper). 12/26/2024 Evening    polyethylene glycol (MIRALAX/GLYCOLAX) Packet Take 17 g by mouth as needed 12/25/2024 Morning

## 2024-12-27 NOTE — LETTER
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]  Bilateral pulmonary embolism (H) [I26.99]  Admit Date/Time: 2024 10:12 AM  Discharge Date: 24  Payor Source: Payor: MEDICARE / Plan: MEDICARE / Product Type: Medicare /          Reason for Communication Hand-off Referral: Fragility    Discharge Plan: Discharge to Long Term Care--[facility]   Discharge Plan:      Flowsheet Row Most Recent Value   Disposition Comments [facility]   Concerns Comments needs LTC with therapy          Discharge Needs Assessment:  Needs      Flowsheet Row Most Recent Value   Equipment Currently Used at Home grab bar, tub/shower, lift device, commode chair, tub bench   # of Referrals Placed by CM External Care Coordination, Post Acute Facilities, Transportation            Any outstanding tests or procedures:    Procedures       Future Labs/Procedures    Oxygen (SNF/TCU) Discharge             Referrals       Future Labs/Procedures    Occupational Therapy Adult Consult     Comments:    Evaluate and treat as clinically indicated.    Reason:  acute PE with respiratory failure, underlying MSA    Physical Therapy Adult Consult     Comments:    Evaluate and treat as clinically indicated.    Reason:  acute PE with respiratory failure, underlying MSA                JESSENIA Blackburn    AVS/Discharge Summary is the source of truth; this is a helpful guide for improved communication of patient story

## 2024-12-27 NOTE — LETTER
[facility] INTENSIVE [facility]ARE  [address] [place] [address]  Phone: [phone]    December 28, 2024        Anni Valverde  [address] [address] [place] [address]  [place] [id number]          To whom it may concern:    RE: Anni Valverde    ***    Please contact me for questions or concerns.      Sincerely,      Ashley Lopez MD

## 2024-12-27 NOTE — H&P
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  Patient has been started on heparin drip and admitted to intermediate overflow status in the ICU for close monitoring of respiratory stability.  At this time, hospitalization of more than 2 midnights are anticipated prior to patient being medically stable for discharge.      Principal Problem:    Acute respiratory failure with hypoxia (H)    Bilateral pulmonary embolism (H)    Assessment: Respiratory symptoms beginning acutely 2 days ago and worsening yesterday with associated weakness and inability to perform transfers with husbands support as per her baseline.  Oxygen was 88% on 8L at time of ED presentation via EMS and patient transitioned to HFNC and now on 40L and 80-90% FiO2 with work of breathing improving and saturations stabilizing above 90.  CTA shows segmental PEs bilaterally and patient has been started on heparin drip.  Patient has risk factors of Arimedex use and overall immobility at baseline.      Plan:   -admit to inpatient status, intermediate overflow status given current HFNC needs and possible worsening in the next 12-24 hours.   -continue high intensity IV heparin  -continue HFNC and monitor respiratory status closely with titration as clinically indicated,  consider Bipap if patient fails optimization of HFNC management.   -pharmacy liaison consult placed for assistance in DOAC coverage evaluation  -US bilateral lower legs tomorrow to assess for DVT  -Hold Arimedex for now and will need to discuss with patient's primary oncology team through [facility] the benefit and risk of restarting this medication going forward.  In discussion with IP pharmacy, Arimedex can be held for 3-5 days without ill effects.  Anticipate connection with patient's primary oncologist on Monday, however if patient is anticipated to be medically ready for discharge prior, could discuss with oncologist on call instead.      Active Problems:    Multiple system atrophy (H)    Assessment: patient diagnosed approximately 8 years ago and [facility].  Takes mirabegron daily.  Main issues are neurogenic bladder, orthostatic hypotension and progressive muscular weakness.  Wheelchair bound at baseline and  assists with transfers with sit to stand device that sounds similar to a Vilma Steady.  No lift equipment at home.  No current home care services     Plan:   -proceed with treatment of medical issues as above  -Anticipate patient will need physical therapy evaluation and care transition consultation for discharge planning going forward as patient has respiratory improvement.   and patient are open to TCU if needed but would like to try to get patient home if it is safe and would consider home care involvement.    -anticipate outpatient neurology follow up with primary neurology team following discharge.        Personal history of malignant neoplasm of breast on Arimedex    Assessment: patient was diagnosed with Stage 1A cancer with lumpectomy and initiation of Arimedex in early 2023. No lymph node dissection performed given desire to avoid general anesthesia and radiation avoided due to side effects.  [facility].      Plan:   -will hold Arimedex for now given known increase in PE and other thromoembolisms with this medications and proceed with PE treatment as above  -will need to discuss with oncology team going forward the benefit/risk of Arimedex at time of discharge       Neurogenic bladder    Chronic indwelling Tamez catheter    Frequent UTI    Assessment: secondary to patient multisystem atrophy.  Patient had been on daily Bactrim but recently had a UTI that was resistant to Bactrim and following treatment patient was transitioned to daily Macrobid prophylaxis instead.  No current urinary symtoms.  Tamez catheter was exchanged in ED today but unclear if this was before or after UA collection with UA concerning for possible infection and UC pending    Plan:   -nursing staff to clarify sample collection timing (before or after catheter exchange)  -continue with daily Macrobid for now given patient's lack of symptoms   -monitor UC results      Orthostatic hypotension    Assessment: present at baseline but patient is not currently on medication for this.  Can lead to significant variation in blood pressure depending on positioning (systolic blood pressures can range form  within minutes per family).      Plan:   -continue supportive cares and positioning changes as appropriate.       Hydronephrosis of left kidney    Duplicated ureter, left    Assessment: noted in 2023.  CT scan today shows concern for possible worsening    Plan:   -will perform renal ultrasound tomorrow to assess hydronephrosis severity and compare to [facility] image results to ensure stability       Hypothyroidism following radioiodine therapy    Assessment: history of thyroid cancer many years ago, per  patient need brand name synthroid.  TSH has been elevated in recent months and patient has been titrating up on dosing, most recently increased to 112 mcg daily with plan for recheck TSH in mid-January for assessment.      Plan:    -Continue Synthroid 112 mcg daily and  will bring in home regimen in brand name cannot be obtained by IP pharmacy team.    -outpatient follow up and re-testing as previously planned       Anxiety    Panic disorder    Assessment: patient is on Celexa with prn Ativan with symptoms well controlled     Plan:   -continue will Celexa as per home regimen  -Hold prn Ativan at this time to avoid further respiratory suppression and monitor for stability       Gastroesophageal reflux disease without esophagitis    Assessment: on Prilosec daily     Plan:   -transition to Protonix as per hospital formulary and restart omeprazole at time of discharge       Osteoporosis    Assessment: noted, on calcium and vitamin D at baseline, no history of fracture    Plan:   -restart calcium and vitamin D at time of discharge           Diet:  Regular diet  DVT Prophylaxis: heparin drip  Hays Catheter: Not present  Lines: None     Cardiac Monitoring: None  Code Status:  Full code - trial of intubation but would not want prolonged ventilation or tracheostomy     Clinically Significant Risk Factors Present on Admission                                        Disposition Plan     Medically Ready for Discharge: Anticipated in 2-4 Days           Swati Lombardi MD  Hospitalist Service  [facility]  Securely message with [employer] (more info)  Text page via [facility] Paging/Directory     ______________________________________________________________________    Chief Complaint   Shortness of breath x2 days with worsening weakness     History is obtained from the patient and her family     History of Present Illness   Anni Valverde is a 74 year old female with PMHx of 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  She was found to have acute respiratory failure on EMS assessment and was placed on 8L oxymask with O2 saturations still only in the mid to upper 80s on arrival to the ED.  Patient transitioned to HFNC and had improvement and stability of oxygen saturations of 40L and 80-90% FiO2.  Work up has shown normal WBC, normal VBG but elevated D dimer and CTA showed multiple segmental PE bilaterally noted on CTA.   Patient has been started on heparin drip and admitted to intermediate overflow status in the ICU with plan as outlined above.        Past Medical History    Past Medical History:   Diagnosis Date    Anxiety     Depression     Endometriosis     Migraine     resolved after halfway    Post-surgical hypothyroidism 1998    Thyroid cancer (H) 1998       Past Surgical History   Past Surgical History:   Procedure Laterality Date    HYSTERECTOMY      SALPINGO-OOPHORECTOMY BILATERAL      THYROIDECTOMY  1998    Papillary       Prior to Admission Medications   Prior to Admission Medications   Prescriptions Last Dose Informant Patient Reported? Taking?   LORazepam (ATIVAN) 0.5 MG tablet More than a month  Yes Yes   Sig: Take 1 tablet by mouth as needed   acetaminophen (TYLENOL) 500 MG tablet 12/27/2024 Morning  Yes Yes   Sig: Take 500 mg by mouth every 6 hours as needed   anastrozole (ARIMIDEX) 1 MG tablet 12/26/2024 Morning  Yes Yes   Sig: Take 1 tablet by mouth daily.   aspirin-acetaminophen-caffeine (EXCEDRIN MIGRAINE) 250-250-65 MG per tablet 12/27/2024 Morning  Yes Yes   Sig: Take 1 tablet by mouth as needed   calcium-vitamin D (CALTRATE) 600-400 MG-UNIT per tablet 12/26/2024 Bedtime  Yes Yes   Sig: Take 1 tablet by mouth 2 times daily   citalopram (CELEXA) 20 MG tablet 12/26/2024 Bedtime  Yes  Yes   Sig: Take 20 mg by mouth at bedtime.   diclofenac (VOLTAREN) 1 % topical gel Past Month  Yes Yes   Sig: Place 2 g onto the skin 4 times daily as needed for moderate pain (bilateral knees).   levothyroxine (SYNTHROID) 112 MCG tablet 12/27/2024 Morning  Yes Yes   Sig: Take 112 mcg by mouth every morning (before breakfast). BRAND NAME ONLY   mirabegron (MYRBETRIQ) 25 MG 24 hr tablet 12/26/2024 Morning  Yes Yes   Sig: Take 1 tablet by mouth daily.   nitroFURantoin macrocrystal-monohydrate (MACROBID) 100 MG capsule 12/26/2024 Morning  Yes Yes   Sig: Take 100 mg by mouth daily. prophy   omeprazole (PRILOSEC) 40 MG DR capsule 12/26/2024 Evening  Yes Yes   Sig: Take 40 mg by mouth every evening. One hour before supper   oxyBUTYnin ER (DITROPAN XL) 5 MG 24 hr tablet 12/26/2024 Evening  Yes Yes   Sig: Take 5 mg by mouth daily (before supper).   polyethylene glycol (MIRALAX/GLYCOLAX) Packet 12/25/2024 Morning  Yes Yes   Sig: Take 17 g by mouth as needed      Facility-Administered Medications: None        Physical Exam   Vital Signs: Temp: 98.5  F (36.9  C) Temp src: Oral BP: 121/61 Pulse: 83   Resp: 22 SpO2: 92 % O2 Device: High Flow Nasal Cannula (HFNC) Oxygen Delivery: 8 LPM  Weight: 190 lbs 0 oz    Constitutional: awake, alert, cooperative, no apparent distress at rest, thickened speech which per family is baseline, and appears stated age  Respiratory: Mild tachypnea at rest but no use of accessory muscles, air movement decreased but no crackles or wheezes noted   Cardiovascular: RRR without murmur  GI: bowel sounds present, abdomen soft and non-tender   Musculoskeletal: patient does have 1+ pitting edema in bilateral lower legs which  reports is her baseline, decreased strength is legs and arms noted.   Neurologic: Awake, alert, oriented to name, place and situation     Medical Decision Making       92 MINUTES SPENT BY ME on the date of service doing chart review, history, exam, documentation & further  activities per the note.      Data     I have personally reviewed the following data over the past 24 hrs:    6.8  \   11.5 (L)   / 190     138 104 21.5 /  111 (H)   3.9 21 (L) 0.95 \     ALT: 22 AST: 35 AP: 107 TBILI: 1.1   ALB: 3.8 TOT PROTEIN: 6.9 LIPASE: N/A     Trop: 70 (H) BNP: 139     Procal: 0.08 CRP: N/A Lactic Acid: 0.6 (L)       INR:  N/A PTT:  N/A   D-dimer:  0.96 (H) Fibrinogen:  N/A       Imaging results reviewed over the past 24 hrs:   Recent Results (from the past 24 hours)   XR Chest Port 1 View    Narrative    XR CHEST PORT 1 VIEW 2024 11:05 AM    HISTORY: Hypoxia    COMPARISON: None.      Impression    IMPRESSION: Elevated right hemidiaphragm. Trace right pleural effusion  and mild bibasilar atelectasis. Pulmonary vascular congestion. Normal  heart size.    MATTHEW LANCE MD         SYSTEM ID:  [id number]   Echocardiogram Complete   Result Value    LVEF  60%    Narrative    [id number]  [id number]  [id number]  [id number]^LUCY^MARGAUX^NORMAN     [facility]  Echocardiography Laboratory  [address] [place] [address]     Name: MADDI ROLDAN  MRN: [id number]  : 1950  Study Date: 2024 12:46 PM  Age: 74 yrs  Gender: Female  Patient Location: [facility]  Reason For Study: Abn EKG  Ordering Physician: MARGAUX AYALA  Referring Physician: Lenin Morgan  Performed By: Kelly Almaraz     BSA: 1.9 m2  Height: 67 in  Weight: 181 lb  HR: 67  BP: 114/79 mmHg  ______________________________________________________________________________  Procedure  Echocardiogram with two-dimensional, color and spectral Doppler.  ______________________________________________________________________________  Interpretation Summary     1. The left ventricle is normal in structure, function and size. The visual  ejection fraction is estimated at 60%.  2. The right ventricle is normal in structure, function and size.  3. No valve disease.  4. Ascending aorta dilatation is  present. 3.9cm.     No previous echo for comparison.  ______________________________________________________________________________  Left Ventricle  The left ventricle is normal in structure, function and size. There is normal  left ventricular wall thickness. The visual ejection fraction is estimated at  60%. Left ventricular diastolic function is normal. Normal left ventricular  wall motion.     Right Ventricle  The right ventricle is normal in structure, function and size.     Atria  Normal left atrial size. Right atrial size is normal. There is no atrial shunt  seen.     Mitral Valve  There is no mitral regurgitation noted.     Tricuspid Valve  There is mild (1+) tricuspid regurgitation.     Aortic Valve  There is mild trileaflet aortic sclerosis.     Pulmonic Valve  The pulmonic valve is normal in structure and function.     Vessels  Ascending aorta dilatation is present. The inferior vena cava was normal in  size with preserved respiratory variability.     Pericardium  There is no pericardial effusion.     Rhythm  Sinus rhythm was noted.  ______________________________________________________________________________  MMode/2D Measurements & Calculations  IVSd: 1.2 cm     LVIDd: 4.0 cm  LVIDs: 2.4 cm  LVPWd: 1.1 cm  FS: 40.4 %  LV mass(C)d: 156.6 grams  LV mass(C)dI: 80.8 grams/m2  Ao root diam: 3.3 cm  LA dimension: 3.1 cm  asc Aorta Diam: 3.9 cm  LA/Ao: 0.92  LVOT diam: 2.1 cm  LVOT area: 3.4 cm2  Ao root diam index Ht(cm/m): 2.0  Ao root diam index BSA (cm/m2): 1.7  Asc Ao diam index BSA (cm/m2): 2.0  Asc Ao diam index Ht(cm/m): 2.3  LA Volume (BP): 35.5 ml     LA Volume Index (BP): 18.3 ml/m2  RWT: 0.57     Doppler Measurements & Calculations  MV E max torin: 101.0 cm/sec  MV A max torin: 113.8 cm/sec  MV E/A: 0.89  MV dec slope: 421.1 cm/sec2  MV dec time: 0.24 sec  TR max torin: 325.8 cm/sec  TR max P.5 mmHg  E/E' av.4  Lateral E/e': 19.8  Medial E/e': 18.9      ______________________________________________________________________________  Report approved by: Kevyn Talavera MD on 12/27/2024 03:25 PM         CT Chest Pulmonary Embolism w Contrast   Result Value    Radiologist flags Pulmonary embolism (AA)    Narrative    CT CHEST PULMONARY EMBOLISM W CONTRAST 12/27/2024 1:33 PM    CLINICAL HISTORY: Significant hypoxia with unknown etiology.  Elevated  D-dimer.  TECHNIQUE: CT angiogram chest during arterial phase injection IV  contrast. 2D and 3D MIP reconstructions were performed by the CT  technologist. Dose reduction techniques were used.     CONTRAST: 64mL Isovue 370    COMPARISON: None.    FINDINGS:  ANGIOGRAM CHEST: Filling defects in segmental pulmonary artery in the  right upper lobe (series 4, image 115) and left lower lobe (4/116),  consistent with pulmonary emboli. Thoracic aorta is negative for  dissection. No CT evidence of right heart strain.    LUNGS AND PLEURA: Small left and trace right pleural effusion. Mild  bibasilar atelectasis. No suspicious pulmonary nodules or masses. No  pulmonary edema or infarct.    MEDIASTINUM/AXILLAE: No lymphadenopathy. Mildly dilated ascending  thoracic aorta measuring 3.7 x 3.9 cm. Mild coronary artery  calcifications. No pericardial effusion. Small hiatal hernia.    UPPER ABDOMEN: Partially visualized mild left hydronephrosis.    MUSCULOSKELETAL: No suspicious lesions within the bones.      Impression    IMPRESSION:  1.  Positive for pulmonary embolism with small emboli in bilateral  segmental pulmonary emboli. No CT evidence of right heart strain.  2.  Small left and trace right pleural effusions. Bibasilar  atelectasis.  3.  Partly visualized possible mild left hydronephrosis. Consider  follow-up ultrasound or CT for complete evaluation.    [Critical Result: Pulmonary embolism]    Finding was identified on 12/27/2024 1:35 PM.     Dr. Luke was contacted by me on 12/27/2024 1:48 PM and verbalized  understanding of  the critical result.      MATTHEW LANCE MD         SYSTEM ID:  [id number]

## 2024-12-28 ENCOUNTER — APPOINTMENT (OUTPATIENT)
Dept: ULTRASOUND IMAGING | Facility: CLINIC | Age: 74
DRG: 175 | End: 2024-12-28
Attending: FAMILY MEDICINE
Payer: MEDICARE

## 2024-12-28 LAB
ANION GAP SERPL CALCULATED.3IONS-SCNC: 12 MMOL/L (ref 7–15)
BASE EXCESS BLDV CALC-SCNC: 1.5 MMOL/L (ref -3–3)
BUN SERPL-MCNC: 15.1 MG/DL (ref 8–23)
CALCIUM SERPL-MCNC: 9 MG/DL (ref 8.8–10.4)
CHLORIDE SERPL-SCNC: 103 MMOL/L (ref 98–107)
CREAT SERPL-MCNC: 0.93 MG/DL (ref 0.51–0.95)
EGFRCR SERPLBLD CKD-EPI 2021: 65 ML/MIN/1.73M2
ERYTHROCYTE [DISTWIDTH] IN BLOOD BY AUTOMATED COUNT: 12.2 % (ref 10–15)
GLUCOSE SERPL-MCNC: 114 MG/DL (ref 70–99)
HCO3 BLDV-SCNC: 26 MMOL/L (ref 21–28)
HCO3 SERPL-SCNC: 20 MMOL/L (ref 22–29)
HCT VFR BLD AUTO: 32.7 % (ref 35–47)
HGB BLD-MCNC: 11.5 G/DL (ref 11.7–15.7)
MCH RBC QN AUTO: 31.3 PG (ref 26.5–33)
MCHC RBC AUTO-ENTMCNC: 35.2 G/DL (ref 31.5–36.5)
MCV RBC AUTO: 89 FL (ref 78–100)
O2/TOTAL GAS SETTING VFR VENT: 49 %
OXYHGB MFR BLDV: 83 % (ref 70–75)
PCO2 BLDV: 39 MM HG (ref 40–50)
PH BLDV: 7.43 [PH] (ref 7.32–7.43)
PLATELET # BLD AUTO: 187 10E3/UL (ref 150–450)
PO2 BLDV: 48 MM HG (ref 25–47)
POTASSIUM SERPL-SCNC: 3.8 MMOL/L (ref 3.4–5.3)
RBC # BLD AUTO: 3.68 10E6/UL (ref 3.8–5.2)
SAO2 % BLDV: 84.5 % (ref 70–75)
SODIUM SERPL-SCNC: 135 MMOL/L (ref 135–145)
TROPONIN T SERPL HS-MCNC: 120 NG/L
TROPONIN T SERPL HS-MCNC: 125 NG/L
UFH PPP CHRO-ACNC: 0.47 IU/ML
UFH PPP CHRO-ACNC: 0.49 IU/ML
WBC # BLD AUTO: 6.9 10E3/UL (ref 4–11)

## 2024-12-28 PROCEDURE — 250N000013 HC RX MED GY IP 250 OP 250 PS 637: Performed by: FAMILY MEDICINE

## 2024-12-28 PROCEDURE — 99233 SBSQ HOSP IP/OBS HIGH 50: CPT | Performed by: INTERNAL MEDICINE

## 2024-12-28 PROCEDURE — 93970 EXTREMITY STUDY: CPT

## 2024-12-28 PROCEDURE — 250N000011 HC RX IP 250 OP 636: Performed by: FAMILY MEDICINE

## 2024-12-28 PROCEDURE — 99207 PR NO BILLABLE SERVICE THIS VISIT: CPT | Performed by: INTERNAL MEDICINE

## 2024-12-28 PROCEDURE — 76770 US EXAM ABDO BACK WALL COMP: CPT

## 2024-12-28 PROCEDURE — 85014 HEMATOCRIT: CPT | Performed by: FAMILY MEDICINE

## 2024-12-28 PROCEDURE — 82805 BLOOD GASES W/O2 SATURATION: CPT | Performed by: FAMILY MEDICINE

## 2024-12-28 PROCEDURE — 84484 ASSAY OF TROPONIN QUANT: CPT | Performed by: FAMILY MEDICINE

## 2024-12-28 PROCEDURE — 82435 ASSAY OF BLOOD CHLORIDE: CPT | Performed by: FAMILY MEDICINE

## 2024-12-28 PROCEDURE — 250N000011 HC RX IP 250 OP 636: Performed by: INTERNAL MEDICINE

## 2024-12-28 PROCEDURE — 999N000157 HC STATISTIC RCP TIME EA 10 MIN

## 2024-12-28 PROCEDURE — 120N000013 HC R&B IMCU

## 2024-12-28 PROCEDURE — 80048 BASIC METABOLIC PNL TOTAL CA: CPT | Performed by: FAMILY MEDICINE

## 2024-12-28 PROCEDURE — 36415 COLL VENOUS BLD VENIPUNCTURE: CPT | Performed by: FAMILY MEDICINE

## 2024-12-28 PROCEDURE — 85520 HEPARIN ASSAY: CPT | Performed by: FAMILY MEDICINE

## 2024-12-28 RX ORDER — PIPERACILLIN SODIUM, TAZOBACTAM SODIUM 4; .5 G/20ML; G/20ML
4.5 INJECTION, POWDER, LYOPHILIZED, FOR SOLUTION INTRAVENOUS EVERY 6 HOURS
Status: DISCONTINUED | OUTPATIENT
Start: 2024-12-28 | End: 2024-12-29

## 2024-12-28 RX ADMIN — CITALOPRAM HYDROBROMIDE 20 MG: 20 TABLET ORAL at 20:51

## 2024-12-28 RX ADMIN — POLYETHYLENE GLYCOL 3350 17 G: 17 POWDER, FOR SOLUTION ORAL at 09:14

## 2024-12-28 RX ADMIN — NITROFURANTOIN MONOHYDRATE/MACROCRYSTALLINE 100 MG: 25; 75 CAPSULE ORAL at 09:14

## 2024-12-28 RX ADMIN — MIRABEGRON 25 MG: 25 TABLET, FILM COATED, EXTENDED RELEASE ORAL at 09:14

## 2024-12-28 RX ADMIN — OXYBUTYNIN CHLORIDE 5 MG: 5 TABLET, EXTENDED RELEASE ORAL at 16:24

## 2024-12-28 RX ADMIN — PIPERACILLIN AND TAZOBACTAM 4.5 G: 4; .5 INJECTION, POWDER, FOR SOLUTION INTRAVENOUS at 19:39

## 2024-12-28 RX ADMIN — HEPARIN SODIUM 1250 UNITS/HR: 10000 INJECTION, SOLUTION INTRAVENOUS at 04:07

## 2024-12-28 RX ADMIN — PIPERACILLIN AND TAZOBACTAM 4.5 G: 4; .5 INJECTION, POWDER, FOR SOLUTION INTRAVENOUS at 14:05

## 2024-12-28 RX ADMIN — HEPARIN SODIUM 1250 UNITS/HR: 10000 INJECTION, SOLUTION INTRAVENOUS at 22:42

## 2024-12-28 RX ADMIN — PANTOPRAZOLE SODIUM 40 MG: 40 TABLET, DELAYED RELEASE ORAL at 16:24

## 2024-12-28 RX ADMIN — LEVOTHYROXINE SODIUM 112 MCG: 112 TABLET ORAL at 08:35

## 2024-12-28 ASSESSMENT — ACTIVITIES OF DAILY LIVING (ADL)
ADLS_ACUITY_SCORE: 80
ADLS_ACUITY_SCORE: 73
ADLS_ACUITY_SCORE: 80
ADLS_ACUITY_SCORE: 79
ADLS_ACUITY_SCORE: 80
ADLS_ACUITY_SCORE: 80
ADLS_ACUITY_SCORE: 79
ADLS_ACUITY_SCORE: 73
ADLS_ACUITY_SCORE: 80
ADLS_ACUITY_SCORE: 73
ADLS_ACUITY_SCORE: 80
ADLS_ACUITY_SCORE: 79
ADLS_ACUITY_SCORE: 79
ADLS_ACUITY_SCORE: 80
ADLS_ACUITY_SCORE: 80
ADLS_ACUITY_SCORE: 73
ADLS_ACUITY_SCORE: 79
ADLS_ACUITY_SCORE: 80

## 2024-12-28 NOTE — PLAN OF CARE
Goal Outcome Evaluation:      Plan of Care Reviewed With: patient, spouse    Overall Patient Progress: improvingOverall Patient Progress: improving  Major shift events: Heparin dip continues at 1250 units/hr. Heparin XA within goal range x 2 checks and will be rechecked in the AM. Oxygen being administered via HFNC currently at 50% 35L with sats at 93%.   Urine culture positive for: Klebsiella pneumoniae  Patient started on Zosyn  Neuro: Pt is A&Ox4. No neurologic changes noted from pt's baseline.  Pulmonary: Lung sounds are diminished throughout but clear. Pt denies SOB. No cough noted.Pt currently is on HFNC 50% 35L.   CV: Heart sounds are regular. Telemetry has been SR.  GI: Bowel sounds are active throughout. Pt is passing flatus. Pt did have a smear BM this AM. Miralax given as ordered.   : Pt has hays in currently which is chronic for pt r/t neurogenic bladder. Pt's urine is candy and has had adequate urine output.   Skin: Skin is intact. No wounds noted. Pt is being turned and repositioned q2 hours and as needed.   Pain: Pt has denied pain  Activity: Pt was assisted to chair x2 today. Vilma steady was attempted this AM but pt was unable to stand. Pt transferred to chair via ceiling lift and 2A.  Hygiene: Partial bath and catheter cares done.   Lines/Tubes/Drains: PIV x1, HFNC and hays catheter.   Drips: Heparin drip currently infusing at 1250 units/hr.    Plan: Pt is on IV zosyn for positive urine culture. Continue to monitor pt titrate O2 as able. Heparin to continue to infuse as ordered. Heparin XA to be rechecked in AM.

## 2024-12-28 NOTE — PROVIDER NOTIFICATION
" reports that due to patient's autonomic disorders, her vitals can vary widely with each check. Her breathing will often sound \"noisy\" as well. MD updated. Will pass information on to each shift and MD will adjust parameters.   "

## 2024-12-28 NOTE — PLAN OF CARE
Goal Outcome Evaluation:      Plan of Care Reviewed With: patient    Overall Patient Progress: no changeOverall Patient Progress: no change    Outcome Evaluation: High Flow oxygen being weaned down from 40L, 85% to now 35L, 50% fio2. Patient denies any shortness of breath. Heparin drip continues with decreased rate after recent XA draw. Vitals have been WNL, although family reports that it is normal for her vitals to vary significantly because of her disorder. Tamez in place. Troponin continues to increase. Pt denies any chest pain and only reports left foot pain.

## 2024-12-28 NOTE — PROGRESS NOTES
[facility]    Medicine Progress Note - Hospitalist Service    Date of Admission:  12/27/2024    Assessment & Plan     74-year-old with underlying multisystem atrophy with progressive motor and autonomic dysfunction including neurogenic bladder  (indwelling Tamez catheter), orthostatic hypotension, generalized weakness whom is wheelchair-bound, stage Ia breast cancer on Arimidex, anxiety, hypothyroid, previous history of left-sided hydronephrosis with duplicated left ureter, frequent UTIs on chronic Macrobid admitted on December 27 with acute hypoxemic respiratory failure secondary to multiple segmental pulmonary emboli.      Acute hypoxemic respiratory failure secondary to bilateral pulmonary emboli    Patient was hypoxemic requiring up to high flow nasal cannula, CT imaging obtained and showed bilateral PEs.  Patient was initiated on a heparin drip and risk factors include underlying breast cancer and the use of Arimidex, immobility wheelchair-bound.  Recommend continuation of high intensity heparin, titrate oxygen down and off as able.  Echocardiogram also reassuring.        Troponin elevation secondary to pulmonary emboli    Telemetry monitoring, echocardiogram reassuring.      Multiple system atrophy  Neurogenic bladder with indwelling Tamez catheter  Orthostatic hypotension  Profound debility and weakness wheelchair-bound    Patient follows with [facility] beds at the [facility] neurology.  Unclear if this patient will be safe to discharge home once timing is appropriate.  May need skilled nursing facility placement.  Patient will have physical therapy and Occupational Therapy once not requiring such oxygenation needs.  May need skilled nursing facility dependent on progress.  Spouse has been updated they have much of equipment needed at home due to her underlying neurological condition but may need some assistance initially either in the form of transitioning to skilled nursing facility versus  "home health too early to determine at this time.      Follow-up with neurology in the outpatient setting no changes to PTA medications.    Underlying breast cancer stage Ia    Status post lumpectomy  and is currently on Arimidex.  Holding Arimidex for now.  Patient follows with [facility] oncology.  Arimidex can increase the potential for pulmonary emboli and additionally has underlying malignancy and significant immobility placing her at a high risk.  Recommend prior to  discharge potentially as early as Monday that hospitalist touch base with her primary oncologist to further discuss holding Arimidex versus other options.      History of recurrent urinary tract infections secondary to neurogenic bladder chronic Tamez    Patient is on Macrobid.  No urinary symptoms.  Continue with daily Tamez cares.  Urine culture in progress.    Hydronephrosis of left kidney chronic  Duplicated left ureter    CT imaging showed possibility for worsening.  Renal ultrasound pending and will need follow-up with urology once discharged.      Anxiety  Panic disorder    Due to acute hypoxemic respiratory failure Ativan on hold to further avoid respiratory suppression.  Continued Celexa.    GERD    On Prilosec PTA currently on Protonix.    Osteoporosis    Patient is on replacement.                Diet: Combination Diet Regular Diet Adult    DVT Prophylaxis: Patient is on a heparin drip  Tamez Catheter: PRESENT, indication: Other (Comment) (chronic- neurogenic bladder)  Lines: None     Cardiac Monitoring: ACTIVE order. Indication: PE with demand ischemia  Code Status: Full Code      Clinically Significant Risk Factors Present on Admission                       # Acute Hypoxic Respiratory Failure: Documented O2 saturation < 90%. Continue supplemental oxygen as needed        # Overweight: Estimated body mass index is 29.77 kg/m  as calculated from the following:    Height as of this encounter: 1.702 m (5' 7\").    Weight as of this " encounter: 86.2 kg (190 lb 1.6 oz).              Social Drivers of Health            Disposition Plan     Medically Ready for Discharge:              Ashley Lopez MD  Hospitalist Service  [facility]  Securely message with [employer] (more info)  Text page via [employer] Paging/Directory   ______________________________________________________________________    Interval History     Patient remains hemodynamically stable.  Spouse present and updated in the room.  Discussed during multidisciplinary rounding recommend that we continue to monitor on a heparin drip throughout the weekend, will decide with assistance from pharmacy and depending on insurance coverage regarding oral anticoagulant.    Spouse has concerned regarding fibrinogen levels which she has read on the Internet that can be associated with her underlying condition.  At this time, I do not feel that this will be helpful as regardless treatment will continue to be with intravenous heparin but certainly in the outpatient setting can seek the advice of neurology and hematology oncology due to her comorbidities that place her at a higher risk for PE and DVT.    Patient remains on high flow nasal cannula continue to monitor closely.    Physical Exam   Vital Signs: Temp: 98.4  F (36.9  C) Temp src: Oral BP: 94/57 Pulse: 73   Resp: 16 SpO2: 91 % O2 Device: High Flow Nasal Cannula (HFNC) Oxygen Delivery: 35 LPM  Weight: 190 lbs 1.6 oz    General Exam very pleasant alert and oriented x 3 high flow nasal cannula in place  Lungs clear to auscultation bilaterally  Cardiovascular regular rate and rhythm  Neurological no focal findings  Lower extremity without pitting edema  Psychiatric mood and affect normal    Medical Decision Making       Over 55 minutes spent in the care of this patient complex comorbidities, acute respiratory failure secondary to pulmonary emboli and multiple chronic underlying conditions placing her at a higher risk  if not monitored closely.            Data     I have personally reviewed the following data over the past 24 hrs:    6.9  \   11.5 (L)   / 187     135 103 15.1 /  114 (H)   3.8 20 (L) 0.93 \     Trop: 120 (HH) BNP: N/A       Imaging results reviewed over the past 24 hrs:   Recent Results (from the past 24 hours)   Echocardiogram Complete   Result Value    LVEF  60%    Narrative    [id number]  [id number]  [id number]  [id number]^LUCY^MARGAUX^NORMAN     [facility]  Echocardiography Laboratory  [address] [place] [address]     Name: MADDI ROLDAN  MRN: [id number]  : 1950  Study Date: 2024 12:46 PM  Age: 74 yrs  Gender: Female  Patient Location: [facility]  Reason For Study: Abn EKG  Ordering Physician: MARGAUX AYALA  Referring Physician: Lenin Morgan  Performed By: Kelly Almaraz     BSA: 1.9 m2  Height: 67 in  Weight: 181 lb  HR: 67  BP: 114/79 mmHg  ______________________________________________________________________________  Procedure  Echocardiogram with two-dimensional, color and spectral Doppler.  ______________________________________________________________________________  Interpretation Summary     1. The left ventricle is normal in structure, function and size. The visual  ejection fraction is estimated at 60%.  2. The right ventricle is normal in structure, function and size.  3. No valve disease.  4. Ascending aorta dilatation is present. 3.9cm.     No previous echo for comparison.  ______________________________________________________________________________  Left Ventricle  The left ventricle is normal in structure, function and size. There is normal  left ventricular wall thickness. The visual ejection fraction is estimated at  60%. Left ventricular diastolic function is normal. Normal left ventricular  wall motion.     Right Ventricle  The right ventricle is normal in structure, function and size.     Atria  Normal left atrial size. Right atrial  size is normal. There is no atrial shunt  seen.     Mitral Valve  There is no mitral regurgitation noted.     Tricuspid Valve  There is mild (1+) tricuspid regurgitation.     Aortic Valve  There is mild trileaflet aortic sclerosis.     Pulmonic Valve  The pulmonic valve is normal in structure and function.     Vessels  Ascending aorta dilatation is present. The inferior vena cava was normal in  size with preserved respiratory variability.     Pericardium  There is no pericardial effusion.     Rhythm  Sinus rhythm was noted.  ______________________________________________________________________________  MMode/2D Measurements & Calculations  IVSd: 1.2 cm     LVIDd: 4.0 cm  LVIDs: 2.4 cm  LVPWd: 1.1 cm  FS: 40.4 %  LV mass(C)d: 156.6 grams  LV mass(C)dI: 80.8 grams/m2  Ao root diam: 3.3 cm  LA dimension: 3.1 cm  asc Aorta Diam: 3.9 cm  LA/Ao: 0.92  LVOT diam: 2.1 cm  LVOT area: 3.4 cm2  Ao root diam index Ht(cm/m): 2.0  Ao root diam index BSA (cm/m2): 1.7  Asc Ao diam index BSA (cm/m2): 2.0  Asc Ao diam index Ht(cm/m): 2.3  LA Volume (BP): 35.5 ml     LA Volume Index (BP): 18.3 ml/m2  RWT: 0.57     Doppler Measurements & Calculations  MV E max torin: 101.0 cm/sec  MV A max torin: 113.8 cm/sec  MV E/A: 0.89  MV dec slope: 421.1 cm/sec2  MV dec time: 0.24 sec  TR max torin: 325.8 cm/sec  TR max P.5 mmHg  E/E' av.4  Lateral E/e': 19.8  Medial E/e': 18.9     ______________________________________________________________________________  Report approved by: Kevyn Talavera MD on 2024 03:25 PM         CT Chest Pulmonary Embolism w Contrast   Result Value    Radiologist flags Pulmonary embolism (AA)    Narrative    CT CHEST PULMONARY EMBOLISM W CONTRAST 2024 1:33 PM    CLINICAL HISTORY: Significant hypoxia with unknown etiology.  Elevated  D-dimer.  TECHNIQUE: CT angiogram chest during arterial phase injection IV  contrast. 2D and 3D MIP reconstructions were performed by the CT  technologist. Dose  reduction techniques were used.     CONTRAST: 64mL Isovue 370    COMPARISON: None.    FINDINGS:  ANGIOGRAM CHEST: Filling defects in segmental pulmonary artery in the  right upper lobe (series 4, image 115) and left lower lobe (4/116),  consistent with pulmonary emboli. Thoracic aorta is negative for  dissection. No CT evidence of right heart strain.    LUNGS AND PLEURA: Small left and trace right pleural effusion. Mild  bibasilar atelectasis. No suspicious pulmonary nodules or masses. No  pulmonary edema or infarct.    MEDIASTINUM/AXILLAE: No lymphadenopathy. Mildly dilated ascending  thoracic aorta measuring 3.7 x 3.9 cm. Mild coronary artery  calcifications. No pericardial effusion. Small hiatal hernia.    UPPER ABDOMEN: Partially visualized mild left hydronephrosis.    MUSCULOSKELETAL: No suspicious lesions within the bones.      Impression    IMPRESSION:  1.  Positive for pulmonary embolism with small emboli in bilateral  segmental pulmonary emboli. No CT evidence of right heart strain.  2.  Small left and trace right pleural effusions. Bibasilar  atelectasis.  3.  Partly visualized possible mild left hydronephrosis. Consider  follow-up ultrasound or CT for complete evaluation.    [Critical Result: Pulmonary embolism]    Finding was identified on 12/27/2024 1:35 PM.     Dr. Luke was contacted by me on 12/27/2024 1:48 PM and verbalized  understanding of the critical result.      MATTHEW LANCE MD         SYSTEM ID:  [id number]      Nsaids Pregnancy And Lactation Text: These medications are considered safe up to 30 weeks gestation. It is excreted in breast milk.

## 2024-12-28 NOTE — PLAN OF CARE
Goal Outcome Evaluation:      Plan of Care Reviewed With: patient    Overall Patient Progress: no changeOverall Patient Progress: no change    Outcome Evaluation: Patient remains on High Flow Oxygen at 35L & 50% FiO2 overnight. Hep drip continues at current rate 1250units/hr, redrawn Xa within desired range. Pt repositioned in bed overnight. Tamez cath patent. Trops elevated, see critical results flowsheet. Pt denies any pain, shortness of breath or chest pain. Vital signs stable. Tele SR.

## 2024-12-29 LAB
ALBUMIN SERPL BCG-MCNC: 3.3 G/DL (ref 3.5–5.2)
ALP SERPL-CCNC: 87 U/L (ref 40–150)
ALT SERPL W P-5'-P-CCNC: 32 U/L (ref 0–50)
ANION GAP SERPL CALCULATED.3IONS-SCNC: 12 MMOL/L (ref 7–15)
AST SERPL W P-5'-P-CCNC: 71 U/L (ref 0–45)
BACTERIA UR CULT: ABNORMAL
BILIRUB SERPL-MCNC: 0.8 MG/DL
BUN SERPL-MCNC: 13.8 MG/DL (ref 8–23)
CALCIUM SERPL-MCNC: 8.9 MG/DL (ref 8.8–10.4)
CHLORIDE SERPL-SCNC: 105 MMOL/L (ref 98–107)
CREAT SERPL-MCNC: 0.98 MG/DL (ref 0.51–0.95)
EGFRCR SERPLBLD CKD-EPI 2021: 61 ML/MIN/1.73M2
ERYTHROCYTE [DISTWIDTH] IN BLOOD BY AUTOMATED COUNT: 12.4 % (ref 10–15)
GLUCOSE SERPL-MCNC: 104 MG/DL (ref 70–99)
HCO3 SERPL-SCNC: 20 MMOL/L (ref 22–29)
HCT VFR BLD AUTO: 32.7 % (ref 35–47)
HGB BLD-MCNC: 11.1 G/DL (ref 11.7–15.7)
HOLD SPECIMEN: NORMAL
HOLD SPECIMEN: NORMAL
MCH RBC QN AUTO: 30.4 PG (ref 26.5–33)
MCHC RBC AUTO-ENTMCNC: 33.9 G/DL (ref 31.5–36.5)
MCV RBC AUTO: 90 FL (ref 78–100)
PLATELET # BLD AUTO: 203 10E3/UL (ref 150–450)
POTASSIUM SERPL-SCNC: 3.9 MMOL/L (ref 3.4–5.3)
PROT SERPL-MCNC: 6.4 G/DL (ref 6.4–8.3)
RBC # BLD AUTO: 3.65 10E6/UL (ref 3.8–5.2)
SODIUM SERPL-SCNC: 137 MMOL/L (ref 135–145)
UFH PPP CHRO-ACNC: 0.35 IU/ML
WBC # BLD AUTO: 6.1 10E3/UL (ref 4–11)

## 2024-12-29 PROCEDURE — 84155 ASSAY OF PROTEIN SERUM: CPT | Performed by: INTERNAL MEDICINE

## 2024-12-29 PROCEDURE — 99233 SBSQ HOSP IP/OBS HIGH 50: CPT | Performed by: INTERNAL MEDICINE

## 2024-12-29 PROCEDURE — 120N000013 HC R&B IMCU

## 2024-12-29 PROCEDURE — 85520 HEPARIN ASSAY: CPT | Performed by: INTERNAL MEDICINE

## 2024-12-29 PROCEDURE — 250N000013 HC RX MED GY IP 250 OP 250 PS 637: Performed by: FAMILY MEDICINE

## 2024-12-29 PROCEDURE — 99207 PR NO BILLABLE SERVICE THIS VISIT: CPT | Performed by: INTERNAL MEDICINE

## 2024-12-29 PROCEDURE — 999N000157 HC STATISTIC RCP TIME EA 10 MIN

## 2024-12-29 PROCEDURE — 250N000011 HC RX IP 250 OP 636: Performed by: FAMILY MEDICINE

## 2024-12-29 PROCEDURE — 36415 COLL VENOUS BLD VENIPUNCTURE: CPT | Performed by: INTERNAL MEDICINE

## 2024-12-29 PROCEDURE — 250N000011 HC RX IP 250 OP 636: Performed by: INTERNAL MEDICINE

## 2024-12-29 PROCEDURE — 85018 HEMOGLOBIN: CPT | Performed by: INTERNAL MEDICINE

## 2024-12-29 PROCEDURE — 82435 ASSAY OF BLOOD CHLORIDE: CPT | Performed by: INTERNAL MEDICINE

## 2024-12-29 RX ORDER — CEFTRIAXONE 2 G/1
2 INJECTION, POWDER, FOR SOLUTION INTRAMUSCULAR; INTRAVENOUS EVERY 24 HOURS
Status: DISCONTINUED | OUTPATIENT
Start: 2024-12-29 | End: 2025-01-01

## 2024-12-29 RX ADMIN — CEFTRIAXONE SODIUM 2 G: 2 INJECTION, POWDER, FOR SOLUTION INTRAMUSCULAR; INTRAVENOUS at 13:42

## 2024-12-29 RX ADMIN — HEPARIN SODIUM 1250 UNITS/HR: 10000 INJECTION, SOLUTION INTRAVENOUS at 19:05

## 2024-12-29 RX ADMIN — PANTOPRAZOLE SODIUM 40 MG: 40 TABLET, DELAYED RELEASE ORAL at 16:45

## 2024-12-29 RX ADMIN — LEVOTHYROXINE SODIUM 112 MCG: 112 TABLET ORAL at 07:34

## 2024-12-29 RX ADMIN — MIRABEGRON 25 MG: 25 TABLET, FILM COATED, EXTENDED RELEASE ORAL at 08:54

## 2024-12-29 RX ADMIN — POLYETHYLENE GLYCOL 3350 17 G: 17 POWDER, FOR SOLUTION ORAL at 08:54

## 2024-12-29 RX ADMIN — PIPERACILLIN AND TAZOBACTAM 4.5 G: 4; .5 INJECTION, POWDER, FOR SOLUTION INTRAVENOUS at 08:54

## 2024-12-29 RX ADMIN — CITALOPRAM HYDROBROMIDE 20 MG: 20 TABLET ORAL at 21:31

## 2024-12-29 RX ADMIN — OXYBUTYNIN CHLORIDE 5 MG: 5 TABLET, EXTENDED RELEASE ORAL at 16:45

## 2024-12-29 RX ADMIN — PIPERACILLIN AND TAZOBACTAM 4.5 G: 4; .5 INJECTION, POWDER, FOR SOLUTION INTRAVENOUS at 01:38

## 2024-12-29 ASSESSMENT — ACTIVITIES OF DAILY LIVING (ADL)
ADLS_ACUITY_SCORE: 80

## 2024-12-29 NOTE — PLAN OF CARE
Goal Outcome Evaluation:      Plan of Care Reviewed With: patient    Overall Patient Progress: improvingOverall Patient Progress: improving    Outcome Evaluation: Patient A&Ox4. Pt denies pain overnight. Remains on High Flow NC, currently 40% FiO2 & 30L. Lungs are diminished, clear. Heparin XA 0.35, continue rate of 1250units/hr. IV Zosyn given for UTI. Tamez cath patent. Tele NSR. Pt repositioned in bed as tolerated.

## 2024-12-29 NOTE — PROGRESS NOTES
[facility]    Medicine Progress Note - Hospitalist Service    Date of Admission:  12/27/2024    Assessment & Plan     74-year-old with underlying multisystem atrophy with progressive motor and autonomic dysfunction to include neurogenic bladder  (indwelling Tamez catheter), orthostatic hypotension, generalized weakness basically wheelchair-bound, stage Ia breast cancer on Arimidex, anxiety, hypothyroid, previous history of left-sided hydronephrosis with duplicated left ureter, frequent UTIs on chronic Macrobid admitted on December 27 with acute hypoxemic respiratory failure secondary to multiple segmental pulmonary emboli and then found to also have an underlying urinary tract infection.      Acute hypoxemic respiratory failure secondary to bilateral pulmonary emboli    Patient remains hypoxemic.  CT imaging consistent with bilateral PEs but no evidence on imaging of cardiac strain.  Echocardiogram is reassuring.  Patient has been on a heparin drip, has multiple risk factors including decreased to no mobility, underlying breast cancer and the use of Arimidex.    Continue to monitor and titrate oxygen down and off as able.  Respiratory therapy is following.  Holding Arimidex for now, remain on heparin drip today.    Consider transitioning to oral anticoagulation on Monday, December 30 once discussed with her primary hematologist oncologist to see what the most appropriate medication would be for this complex patient.        Troponin elevation secondary to pulmonary emboli suspect this is demand type II ischemia    Echocardiogram reassuring.  No evidence of cardiac strain.  EKG without ST elevation concerns.  Recommend ongoing telemetry monitoring. No need for serial troponin at this time unless chest pain/pressure.         Multiple system atrophy  Neurogenic bladder with indwelling Tamez catheter  Orthostatic hypotension  Profound debility and weakness wheelchair-bound    Patient  follows with [facility] neurology.  Unclear if this patient will be safe to discharge home once timing is appropriate and suspect initially may need skilled nursing facility placement.  Once respiratory status is more acceptable we will have physical therapy and Occupational Therapy perform assessment.  Spouse is primary caregiver and they are set up at home to assist the patient but she has to be able to participate in some of these activities and it is unclear if this will be possible once medically stable to discharge from the hospital.  Spouse and patient agreeable to skilled nursing facility placement if that is the recommendation.      Underlying breast cancer stage Ia    Status post lumpectomy  and is currently on Arimidex which is currently on hold as this can cause hypercoagulable state.  Admitting physician discussed with pharmacy okay to hold for now and on Monday recommend reaching out to primary oncologist to further discuss if this should be an indefinite hold or what are some of the other options and additionally recommend discussion with hematologist oncologist regarding what may be the most appropriate anticoagulant for this patient with underlying malignancy.        History of recurrent urinary tract infections secondary to neurogenic bladder chronic Tamez  UTI Klebsiella    Previously on suppressive therapy with Macrobid, started on Zosyn once urine culture noted, sensitivities back now and de-escalating to Rocephin.         Hydronephrosis of left kidney chronic  Duplicated left ureter    CT imaging showed possibility for worsening.  Renal ultrasound reassuring. Recommend follow up with Urology.       Anxiety  Panic disorder    Due to acute hypoxemic respiratory failure Ativan on hold to further avoid respiratory suppression.  Continued Celexa.    GERD    On Prilosec PTA currently on Protonix.    Osteoporosis    Patient is on replacement.                Diet: Combination Diet Regular Diet  "Adult    DVT Prophylaxis: Patient is on a heparin drip  Tamez Catheter: PRESENT, indication: Other (Comment) (chronic)  Lines: None     Cardiac Monitoring: ACTIVE order. Indication: PE with demand ischemia  Code Status: Full Code      Clinically Significant Risk Factors               # Hypoalbuminemia: Lowest albumin = 3.3 g/dL at 12/29/2024  5:08 AM, will monitor as appropriate                # Overweight: Estimated body mass index is 29.71 kg/m  as calculated from the following:    Height as of this encounter: 1.702 m (5' 7\").    Weight as of this encounter: 86 kg (189 lb 11.2 oz)., PRESENT ON ADMISSION            Social Drivers of Health            Disposition Plan     Medically Ready for Discharge: unclear timing of discharge at this time.              Ashley Lopez MD  Hospitalist Service  [facility]  Securely message with [employer] (more info)  Text page via [employer] Paging/Directory   ______________________________________________________________________    Interval History     Spouse present.  Updated regarding laboratory results (spouse is a  and goes through laboratory studies/imaging via patient portal).    Discussed with patient and spouse that continues to require oxygenation but is very stable otherwise.  Updated regarding the need to speak with the patient's primary hematologist oncologist to discuss holding Arimidex indefinitely and which anticoagulant would be recommended based on her underlying malignancy and now bilateral pulmonary emboli.          Physical Exam   Vital Signs: Temp: 97.6  F (36.4  C) Temp src: Oral BP: 102/60 Pulse: 84   Resp: 22 SpO2: 94 % O2 Device: Nasal cannula with humidification Oxygen Delivery: 4 LPM  Weight: 189 lbs 11.2 oz    General Exam sitting up very pleasant remains on supplemental oxygen  Lungs clear to auscultation bilaterally no wheezing no tachypnea  Cardiovascular regular rate and rhythm  Neurological no focal " findings  Lower extremity trace lower extremity edema bilaterally  Psychiatric mood and affect normal   indwelling chronic Hays catheter      Medical Decision Making       Over 55 minutes spent in the care of this patient complex comorbidities, acute respiratory failure secondary to pulmonary emboli and multiple chronic underlying conditions placing her at a higher risk if not monitored closely.            Data     I have personally reviewed the following data over the past 24 hrs:    6.1  \   11.1 (L)   / 203     137 105 13.8 /  104 (H)   3.9 20 (L) 0.98 (H) \     ALT: 32 AST: 71 (H) AP: 87 TBILI: 0.8   ALB: 3.3 (L) TOT PROTEIN: 6.4 LIPASE: N/A       Imaging results reviewed over the past 24 hrs:   Recent Results (from the past 24 hours)   US Lower Extremity Venous Duplex Bilateral    Narrative    EXAM: US LOWER EXTREMITY VENOUS DUPLEX BILATERAL  LOCATION: [facility]  DATE: 12/28/2024    INDICATION: Acute pulmonary artery emboli  COMPARISON: None.  TECHNIQUE: Venous Duplex ultrasound of bilateral lower extremities with and without compression, augmentation and duplex. Color flow and spectral Doppler with waveform analysis performed.    FINDINGS: Exam includes the common femoral, femoral, popliteal veins as well as segmentally visualized deep calf veins and greater saphenous vein.     RIGHT: No deep vein thrombosis. No superficial thrombophlebitis. No popliteal cyst.    LEFT: No deep vein thrombosis. No superficial thrombophlebitis. No popliteal cyst.      Impression    IMPRESSION:  1.  No deep venous thrombosis in the bilateral lower extremities.   US Renal Complete Non-Vascular    Narrative    EXAM: US RENAL COMPLETE NON-VASCULAR  LOCATION: [facility]  DATE: 12/28/2024    INDICATION: concer for hydronephrosis on CT in patient with multisystem atrophy and neurogenic bladder with chronic indwelling hays  COMPARISON: CT 12/27/2024 and ultrasound  4/1/2024  TECHNIQUE: Routine Bilateral Renal and Bladder Ultrasound.    FINDINGS:    RIGHT KIDNEY: 11.2 x 4.1 x 3.9 cm. Normal cortical echogenicity and thickness. No shadowing stone or focal lesion. No evidence of hydronephrosis.     LEFT KIDNEY: 11.0 x 6.5 x 6.2 cm. The exam is limited due to overlying bowel gas. Normal cortical echogenicity and thickness. No shadowing stone or focal lesion. No evidence of hydronephrosis.     BLADDER: Decompressed by a Tamez catheter.      Impression    IMPRESSION:  1.  Normal kidney ultrasound. No evidence of hydronephrosis.

## 2024-12-29 NOTE — PLAN OF CARE
Goal Outcome Evaluation:      Plan of Care Reviewed With: patient, spouse    Overall Patient Progress: improvingOverall Patient Progress: improving     Major shift events: Oxygen being titrated down. Pt is now off high flow oxygen and is on O2 via humidified NC at 3.5L with O2 sat 94%. Will continue to titrate as able. Pt states that she feels like she has improved some from yesterday. VSS.  Zosyn discontinued and pt switched to rocephin.  Neuro: Pt is A&Ox4, no neurological changes noted.  Pulmonary: Lung sounds are clear throughout. Pt is on O2 via NC at 3.5L. Pt denies SOB. Rare dry cough noted.  CV: Heart sounds are regular. Tele is SR.  GI: Bowel sounds are active. No bowel movement this shift. Pt did take miralax this AM.  : Pt has hays in. Hays use is chronic.  Skin: Intact-no wounds noted. Pt is being turned and repositioned q1-2 hrs and PRN.   Pain: Pt denies  Activity: Lift with 2A. Pt up to chair for meals.  Hygiene: Done with assistance.  Lines/Tubes/Drains: NC, PIV x1 and hays catheter.  Drips: Heparin drip infusing at 1250units/hr.  Plan: Continue to monitor pt throughout this shift and titrate O2 as able.

## 2024-12-30 ENCOUNTER — APPOINTMENT (OUTPATIENT)
Dept: PHYSICAL THERAPY | Facility: CLINIC | Age: 74
DRG: 175 | End: 2024-12-30
Attending: STUDENT IN AN ORGANIZED HEALTH CARE EDUCATION/TRAINING PROGRAM
Payer: MEDICARE

## 2024-12-30 LAB
ANION GAP SERPL CALCULATED.3IONS-SCNC: 10 MMOL/L (ref 7–15)
BUN SERPL-MCNC: 14.4 MG/DL (ref 8–23)
CALCIUM SERPL-MCNC: 9.2 MG/DL (ref 8.8–10.4)
CHLORIDE SERPL-SCNC: 105 MMOL/L (ref 98–107)
CREAT SERPL-MCNC: 0.86 MG/DL (ref 0.51–0.95)
EGFRCR SERPLBLD CKD-EPI 2021: 71 ML/MIN/1.73M2
ERYTHROCYTE [DISTWIDTH] IN BLOOD BY AUTOMATED COUNT: 12.5 % (ref 10–15)
GLUCOSE SERPL-MCNC: 96 MG/DL (ref 70–99)
HCO3 SERPL-SCNC: 23 MMOL/L (ref 22–29)
HCT VFR BLD AUTO: 32.9 % (ref 35–47)
HGB BLD-MCNC: 11.2 G/DL (ref 11.7–15.7)
HOLD SPECIMEN: NORMAL
MCH RBC QN AUTO: 30.7 PG (ref 26.5–33)
MCHC RBC AUTO-ENTMCNC: 34 G/DL (ref 31.5–36.5)
MCV RBC AUTO: 90 FL (ref 78–100)
PLATELET # BLD AUTO: 222 10E3/UL (ref 150–450)
POTASSIUM SERPL-SCNC: 3.9 MMOL/L (ref 3.4–5.3)
RBC # BLD AUTO: 3.65 10E6/UL (ref 3.8–5.2)
SODIUM SERPL-SCNC: 138 MMOL/L (ref 135–145)
UFH PPP CHRO-ACNC: 0.43 IU/ML
WBC # BLD AUTO: 5.5 10E3/UL (ref 4–11)

## 2024-12-30 PROCEDURE — 82310 ASSAY OF CALCIUM: CPT | Performed by: STUDENT IN AN ORGANIZED HEALTH CARE EDUCATION/TRAINING PROGRAM

## 2024-12-30 PROCEDURE — 85520 HEPARIN ASSAY: CPT | Performed by: INTERNAL MEDICINE

## 2024-12-30 PROCEDURE — 99233 SBSQ HOSP IP/OBS HIGH 50: CPT | Performed by: STUDENT IN AN ORGANIZED HEALTH CARE EDUCATION/TRAINING PROGRAM

## 2024-12-30 PROCEDURE — 250N000013 HC RX MED GY IP 250 OP 250 PS 637: Performed by: STUDENT IN AN ORGANIZED HEALTH CARE EDUCATION/TRAINING PROGRAM

## 2024-12-30 PROCEDURE — 97162 PT EVAL MOD COMPLEX 30 MIN: CPT | Mod: GP

## 2024-12-30 PROCEDURE — 250N000011 HC RX IP 250 OP 636: Performed by: STUDENT IN AN ORGANIZED HEALTH CARE EDUCATION/TRAINING PROGRAM

## 2024-12-30 PROCEDURE — 36415 COLL VENOUS BLD VENIPUNCTURE: CPT | Performed by: INTERNAL MEDICINE

## 2024-12-30 PROCEDURE — 120N000001 HC R&B MED SURG/OB

## 2024-12-30 PROCEDURE — 85018 HEMOGLOBIN: CPT | Performed by: FAMILY MEDICINE

## 2024-12-30 PROCEDURE — 250N000013 HC RX MED GY IP 250 OP 250 PS 637: Performed by: FAMILY MEDICINE

## 2024-12-30 PROCEDURE — 80048 BASIC METABOLIC PNL TOTAL CA: CPT | Performed by: STUDENT IN AN ORGANIZED HEALTH CARE EDUCATION/TRAINING PROGRAM

## 2024-12-30 RX ADMIN — PANTOPRAZOLE SODIUM 40 MG: 40 TABLET, DELAYED RELEASE ORAL at 16:40

## 2024-12-30 RX ADMIN — OXYBUTYNIN CHLORIDE 5 MG: 5 TABLET, EXTENDED RELEASE ORAL at 16:41

## 2024-12-30 RX ADMIN — MIRABEGRON 25 MG: 25 TABLET, FILM COATED, EXTENDED RELEASE ORAL at 08:52

## 2024-12-30 RX ADMIN — CITALOPRAM HYDROBROMIDE 20 MG: 20 TABLET ORAL at 21:42

## 2024-12-30 RX ADMIN — POLYETHYLENE GLYCOL 3350 17 G: 17 POWDER, FOR SOLUTION ORAL at 08:52

## 2024-12-30 RX ADMIN — LEVOTHYROXINE SODIUM 112 MCG: 112 TABLET ORAL at 07:51

## 2024-12-30 RX ADMIN — RIVAROXABAN 15 MG: 10 TABLET, FILM COATED ORAL at 20:44

## 2024-12-30 RX ADMIN — RIVAROXABAN 15 MG: 10 TABLET, FILM COATED ORAL at 12:13

## 2024-12-30 RX ADMIN — CEFTRIAXONE SODIUM 2 G: 2 INJECTION, POWDER, FOR SOLUTION INTRAMUSCULAR; INTRAVENOUS at 14:13

## 2024-12-30 ASSESSMENT — ACTIVITIES OF DAILY LIVING (ADL)
ADLS_ACUITY_SCORE: 80

## 2024-12-30 NOTE — PLAN OF CARE
"Goal Outcome Evaluation:      Plan of Care Reviewed With: patient    Overall Patient Progress: improvingOverall Patient Progress: improving    Outcome Evaluation: Pt A&Ox4.  Garbled speech.  VSS with soft BP (Map above 65) with 4 L O2. Lungs clear, but diminished. IS to 500ml. Left pupil slighly larger than right. Pt states this is baseline.  Tamez patent with candy cloudy urine. IV Rocephin for UTI. Heparin at 1250units/hr. Tele SR. Denies pain. Plan for PT.   at bedside. Transfers with 2A/Vilma steady.    BP 94/59 (BP Location: Left arm, Cuff Size: Adult Regular)   Pulse 74   Temp 98.2  F (36.8  C)   Resp 21   Ht 1.702 m (5' 7\")   Wt 85.3 kg (188 lb 1.6 oz)   SpO2 91%   BMI 29.46 kg/m      "

## 2024-12-30 NOTE — PLAN OF CARE
Goal Outcome Evaluation:      Plan of Care Reviewed With: patient    Overall Patient Progress: improvingOverall Patient Progress: improving    Outcome Evaluation: Patient is overall improving, she has garbled speech but can make words out enough to make her words and needs known. Did work with PT this afternoon, was able to use vivek steady transfer device to get on bedside commode for BM. Once back to bed, noticed there was a hole in her urine bag at the juncture of where we empty the bag, so bag changed under sterile procedure.  Was on 4 LPM nasal cannula prior to getting up with PT, but with the transfer increased to 6 LPM nasal cannula until back in bed. Discussed options for discharge with her spouse, including possibility of needing some rehab at discharge for strengthening. He would like resources from  to know what options are in the area for rehab as well as assisted living type resources in the future if necessary.  Currently he has been providing her care at home, but if her strength is not back to baseline, he is unsure if he will be able to continue to do so. Heparin drip stopped and Xarelto oral started, appetite is fair. Telemetry is sinus rhythm.

## 2024-12-30 NOTE — PLAN OF CARE
Goal Outcome Evaluation:      Plan of Care Reviewed With: patient    Overall Patient Progress: improvingOverall Patient Progress: improving    Outcome Evaluation: Pt A&Ox4. VSS on 2-3L O2. Pt denies pain. Repo in bed overnight. Hep Xa 0.43 - keep at current rate 1250units/hr. Tele SR.

## 2024-12-30 NOTE — PLAN OF CARE
Goal Outcome Evaluation: [id number]      Plan of Care Reviewed With: patient    Overall Patient Progress: improvingOverall Patient Progress: improving    Outcome Evaluation: The patient is A&Ox4, VSS, denies pain, assisted to the chair, to BSC, then to bed over a couple of hours via vivek stedy, 2 assist, gait belt. No change to Heparin drip, will check xa in am.

## 2024-12-30 NOTE — PROGRESS NOTES
S- Transfer to 252 from 215 at 1100    B- bilateral pulmonary embolisms    A- Brief systems assessment: patient is wheelchair bound at baseline, alert and oriented to baseline,  is present, up in recliner chair for breakfast, on heparin drip at 1250 units per hour. Will be stopping heparin drip once Xeralto oral medication is started (within the same hour at the direction of the provider). Transfers with help of Vilma steady transfer device vs lift, is in a ceiling lift room for ease of use as necessary. Telemetry continued at time of transfer.     R- Transfer to 252 per physician orders. Continue to monitor pt and update physician as needed.      Code status: Full Code  Skin: intact  Fall Risk: Yes- Department fall risk interventions implemented.  Isolation and Signage: None  Medication drips upon transfer: heparin drip  Blue Bin checked and medications transfer out with patient: Yes

## 2024-12-30 NOTE — CONSULTS
Patient has [employer] through an employer.    Xarelto/Eliquis:  $100/mo at retail pharmacy, or $90 for 3 mo at [facility]. Patient is eligible to download a copay savings card from [employer] or eliquis.com, respectively, to reduce this to $10/mo.    Alba Workman  Pharmacy Technician/Liaison, Discharge Pharmacy   [phone] (voice or text)  [email or web address]  Pharmacy test claims are estimates and may not reflect final costs.   Suggested alternatives aim to be cost-effective but may not be therapeutically equivalent as this consult is informational and does not constitute medical advice.   Clinical decisions should be made by qualified healthcare providers.

## 2024-12-30 NOTE — PROGRESS NOTES
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      Acute hypoxemic respiratory failure secondary to bilateral pulmonary emboli  Patient remains hypoxemic.  CT imaging consistent with bilateral PEs but no evidence on imaging of cardiac strain.  Echocardiogram is reassuring.  Patient has been on a heparin drip, has multiple risk factors including decreased to no mobility, underlying breast cancer and the use of Arimidex.   -- continuing to hold Arimidex for now, will need to discuss with oncology/hematology, call placed   -- Continue to monitor and titrate oxygen down and off as able.  Respiratory therapy is following, on 4L via NC   -- transitioning to oral anticoagulation today, starting on Xarelto       Troponin elevation secondary to pulmonary emboli suspect this is demand type II ischemia  Echocardiogram reassuring.  No evidence of cardiac strain.  EKG without ST elevation concerns.    --ongoing telemetry monitoring. No need for serial troponin at this time unless chest pain/pressure.       Multiple system atrophy  Neurogenic bladder with indwelling Tamez catheter  Orthostatic hypotension  Profound debility and weakness wheelchair-bound  Patient follows with [facility] neurology.    -- Physical therapy  and Occupational Therapy ordered today   Spouse is primary caregiver and he is set up at home to assist the patient but she has to be able to participate in some of these activities and it is unclear if this will be possible once medically stable to discharge from the hospital.  Spouse and patient agreeable to skilled nursing facility placement if that is the recommendation.      Underlying breast cancer stage Ia  Status post lumpectomy  and is currently on Arimidex which is currently on hold as this can cause hypercoagulable state.   -- reached out to primary oncologist to further discuss if this should be an indefinite hold or what are some of the other options      History of recurrent urinary tract infections secondary to neurogenic bladder chronic Hays  UTI Klebsiella, UTI due to Indwelling Catheter   Previously on suppressive therapy with Macrobid, started on Zosyn once urine culture noted, sensitivities back now and de-escalating to Rocephin.   -- continue Rocephin  -- today is day #3 of antibiotic   -- Hays catheter changed in the emergency room  -- charge RN verified that hays was replaced in RN and then UA was collected, appreciate clarification     Hydronephrosis of left kidney chronic  Duplicated left ureter  CT imaging showed possibility for worsening.   Renal ultrasound reassuring.   Recommend follow up with Urology.       Anxiety  Panic disorder  Due to acute hypoxemic respiratory failure Ativan on hold to further avoid respiratory suppression.    Continued Celexa.    GERD  On Prilosec PTA currently on Protonix.    Osteoporosis  Patient is on replacement.                Diet: Combination Diet Regular Diet Adult    DVT Prophylaxis: DOAC  Hays Catheter: PRESENT, indication: Other (Comment) (chronic)  Lines: None     Cardiac Monitoring: ACTIVE order. Indication: PE with demand ischemia  Code Status: Full Code      Clinically Significant Risk Factors               # Hypoalbuminemia: Lowest albumin =  "3.3 g/dL at 12/29/2024  5:08 AM, will monitor as appropriate         # Acute Hypoxic Respiratory Failure: Documented O2 saturation < 90%. Continue supplemental oxygen as needed         # Overweight: Estimated body mass index is 29.46 kg/m  as calculated from the following:    Height as of this encounter: 1.702 m (5' 7\").    Weight as of this encounter: 85.3 kg (188 lb 1.6 oz)., PRESENT ON ADMISSION            Social Drivers of Health            Disposition Plan     Medically Ready for Discharge: Anticipated in 2-4 Days             Brittany Regalado MD  Hospitalist Service  [facility]  Securely message with [employer] (more info)  Text page via [employer] Paging/Directory   ______________________________________________________________________    Interval History   Patient was seen and examined while sitting in the armchair today.   is with patient.  Anni tells me that she is feeling better respiratory wise.  She continues on 4 L of oxygen via nasal cannula.  Feels up to working with physical therapy today.        Physical Exam   Vital Signs: Temp: 98.2  F (36.8  C) Temp src: Oral BP: 94/59 Pulse: 74   Resp: 21 SpO2: 91 % O2 Device: Nasal cannula Oxygen Delivery: 4 LPM  Weight: 188 lbs 1.6 oz    General: Sitting up in armchair, looks comfortable, very pleasant, supplemental oxygen  Respiratory: Lungs are clear to auscultation, no wheezing, no Rales  Cardiovascular: Regular rate and rhythm  GI: Indwelling Tamez catheter, chronic    Medical Decision Making       55 MINUTES SPENT BY ME on the date of service doing chart review, history, exam, documentation & further activities per the note.      Data     I have personally reviewed the following data over the past 24 hrs:    5.5  \   11.2 (L)   / 222     138 105 14.4 /  96   3.9 23 0.86 \       Imaging results reviewed over the past 24 hrs:   No results found for this or any previous visit (from the past 24 hours).  "

## 2024-12-30 NOTE — PROGRESS NOTES
"   12/29/24 0745   Appointment Info   Signing Clinician's Name / Credentials (PT) Karel Garcia, PT, DPT   Rehab Comments (PT) Patient amenable to skilled physical therapy services this afternoon.       Present no   Living Environment   People in Home spouse   Living Environment Comments Living situation not specified by patient or  during today's session despite PT questioning.   Self-Care   Usual Activity Tolerance poor   Current Activity Tolerance poor   Regular Exercise No   Equipment Currently Used at Home grab bar, tub/shower;lift device;commode chair;tub bench   Fall history within last six months no   General Information   Onset of Illness/Injury or Date of Surgery 12/27/24   Referring Physician Brittany Regalado MD   Patient/Family Therapy Goals Statement (PT) Have patient safe stand with appropriate assist long enough for  to assist with pineda-cares and ADL's.   Pertinent History of Current Problem (include personal factors and/or comorbidities that impact the POC) Per H & P note, \"Anni Valverde is a 74 year old female with PMHx of multisystem atrophy with progressive motor and autonomic dysfunction (neurogenic bladder with chronic indwelling hays, orthostatic hypotension, generalized weakness and wheelchair bound at baseline), Stage 1A breast cancer in early 2023 on Arimedex, anxiety, hypothyroidism, left hydronephrosis with duplicated left ureter, frequent UTI on prophylactic Macrobid admitted on 12/27/2024 with acute respiratory failure with multiple segmental PE bilaterally noted on CTA, currently requiring HFNC 40L and 80-90% to maintain oxygen saturations.  Patient has been started on heparin drip and admitted to intermediate overflow status in the ICU for close monitoring of respiratory stability.  At this time, hospitalization of more than 2 midnights are anticipated prior to patient being medically stable for discharge.\" Patient is from home with "  who reports performing max assist x1 transfers using a version of a vivek steady to perform stand pivot transfers from bed to wheelchair and from wheelchair to wheeled commode for required mobility and pineda-care demands.   Existing Precautions/Restrictions fall;oxygen therapy device and L/min  (On 4.5L of O2 via nasal cannula at beginning of today's session.)   Weight-Bearing Status - LUE full weight-bearing   Weight-Bearing Status - RUE full weight-bearing   Weight-Bearing Status - LLE full weight-bearing   Weight-Bearing Status - RLE full weight-bearing   Heart Disease Risk Factors   (Orthostatic hypotension.)   General Observations Patient grossly NAD, pleasant, and well groomed at today's session.   Cognition   Affect/Mental Status (Cognition) WFL   Orientation Status (Cognition) oriented x 3   Follows Commands (Cognition) [facility]   Pain Assessment   Patient Currently in Pain No   Integumentary/Edema   Integumentary/Edema no deficits were identifed   Posture    Posture Forward head position;Protracted shoulders;Kyphosis   Range of Motion (ROM)   Range of Motion ROM is L   ROM Comment For bilateral upper and lower extremities.   Strength (Manual Muscle Testing)   Strength (Manual Muscle Testing) strength is [facility]   Strength Comments For bilateral upper and lower extremities.   Bed Mobility   Bed Mobility rolling left;supine-sit   Rolling Left [place] (Bed Mobility) moderate assist (50% patient effort);verbal cues   Supine-Sit [place] (Bed Mobility) moderate assist (50% patient effort);verbal cues   Bed Mobility Limitations decreased ability to use arms for pushing/pulling;decreased ability to use legs for bridging/pushing;impaired ability to control trunk for mobility   Impairments Contributing to Impaired Bed Mobility impaired balance;decreased flexibility;impaired motor control;abnormal muscle tone;impaired postural control;decreased ROM;decreased strength   Assistive Device (Bed Mobility) bed  rails   Comment, (Bed Mobility) Needed cueing for proper hand and foot placement, along with manual placement of lower extremities with verbal cueing for pushing with her arms on rails to propel her to seated position.   Transfers   Transfers toilet transfer;sit-stand transfer   Maintains Weight-bearing Status (Transfers) able to maintain   Transfer Safety Concerns Noted decreased balance during turns;losing balance backward;decreased sequencing ability   Impairments Contributing to Impaired Transfers impaired balance;impaired coordination;decreased flexibility;impaired motor control;abnormal muscle tone;impaired postural control;decreased ROM;decreased strength   Sit-Stand Transfer   Sit-Stand [place] (Transfers) maximum assist (25% patient effort);verbal cues   Assistive Device (Sit-Stand Transfers) other (see comments)  (Vilma steady.)   Toilet Transfer   [place] Level (Toilet Transfer) maximum assist (25% patient effort);verbal cues   Assistive Device (Toilet Transfer) other (see comments)  (Vilma steady.)   Type (Toilet Transfer) sit-stand;stand-sit   Gait/Stairs (Locomotion)   Comment, (Gait/Stairs) Not applicable, patient is non-ambulatory.   Balance   Balance other (describe)   Sitting Balance: Static poor balance   Sitting Balance: Dynamic poor balance   Sit-to-Stand Balance poor balance   Standing Balance: Static poor balance   Standing Balance: Dynamic poor balance   Balance Quick Add Sitting balance: Static;Sitting balance: dynamic;Sit to stand balance;Standing balance: static;Standing balance: dynamic   Sensory Examination   Sensory Perception WFL   Coordination   Coordination no deficits were identified   Muscle Tone   Muscle Tone other (see comments)   Muscle Tone Comments Gross generalized weakness for BUE and BLE's per her chronic 7-8 year history of neurodegenerative disease and change.   Clinical Impression   Criteria for Skilled Therapeutic Intervention Yes, treatment indicated   PT  "Diagnosis (PT) Generalized weakness; Balance Deficits   Influenced by the following impairments Per H & P note, \"Anni Valverde is a 74 year old female with PMHx of multisystem atrophy with progressive motor and autonomic dysfunction (neurogenic bladder with chronic indwelling hays, orthostatic hypotension, generalized weakness and wheelchair bound at baseline), Stage 1A breast cancer in early 2023 on Arimedex, anxiety, hypothyroidism, left hydronephrosis with duplicated left ureter, frequent UTI on prophylactic Macrobid admitted on 12/27/2024 with acute respiratory failure with multiple segmental PE bilaterally noted on CTA, currently requiring HFNC 40L and 80-90% to maintain oxygen saturations. Patient has been started on heparin drip and admitted to intermediate overflow status in the ICU for close monitoring of respiratory stability. At this time, hospitalization of more than 2 midnights are anticipated prior to patient being medically stable for discharge.\"   Functional limitations due to impairments Patient currently below required independence with functional mobility for safe standing using electronic stander device with assist of one from  at this time, is currently requiring max assist x1 PT for safe vivek steady use to safely transfer out of bed at this time due to her aforementioned impairments.   Clinical Presentation (PT Evaluation Complexity) evolving   Clinical Presentation Rationale Based on observation, history, evaluation and clinical judgment.   Clinical Decision Making (Complexity) moderate complexity   Planned Therapy Interventions (PT) balance training;bed mobility training;motor coordination training;neuromuscular re-education;patient/family education;postural re-education;transfer training;risk factor education   Risk & Benefits of therapy have been explained evaluation/treatment results reviewed;care plan/treatment goals reviewed;risks/benefits reviewed;current/potential barriers " reviewed;participants voiced agreement with care plan;participants included;patient;spouse/significant other   Clinical Impression Comments Patient is a 74 year old female with chronic multisystem atrophy with progressive motor and autonomic dysfunction admitted for multiple PE's and acute respiratory failure. Patient currently below required independence with functional mobility for safe standing using electronic stander device with assist of one from  at this time, is currently requiring max assist x1 PT for safe vivek steady use to safely transfer out of bed at this time due to her aforementioned impairments. Patient is close to baseline functional mobility needs necessary for  to assist with basic transfer needs from her bed to the toilet and for required dressing based ADL demands. Recommend discharge to home with assist with  once medical course is complete and she has met her basic functional mobility independence goals with continued skilled inpatient physical therapy services.   PT Total Evaluation Time   PT Eval, Moderate Complexity Minutes (64927) 30   Physical Therapy Goals   PT Frequency 2x/week   PT Predicted Duration/Target Date for Goal Attainment 12/31/24   PT Goals Bed Mobility;Transfers   PT: Bed Mobility Moderate assist;Supine to/from sit;Rolling;Within precautions   PT: Transfers Moderate assist;Sit to/from stand;Bed to/from chair;Assistive device;Within precautions  (With vivek steady or home electronic assistive device.)   PT Discharge Planning   PT Plan MON 1/2: Bed mobility, transfers.   PT Discharge Recommendation (DC Rec) home with assist  (With .)   PT Rationale for DC Rec Patient is a 74 year old female with chronic multisystem atrophy with progressive motor and autonomic dysfunction admitted for multiple PE's and acute respiratory failure. Patient currently below required independence with functional mobility for safe standing using electronic stander device  with assist of one from  at this time, is currently requiring max assist x1 PT for safe vivek steady use to safely transfer out of bed at this time due to her aforementioned impairments. Patient is close to baseline functional mobility needs necessary for  to assist with basic transfer needs from her bed to the toilet and for required dressing based ADL demands. Recommend discharge to home with assist with  once medical course is complete and she has met her basic functional mobility independence goals with continued skilled inpatient physical therapy services.   PT Brief overview of current status MOD assist x1 with verbal cues and hand rail support for left rolling and supine to sit transfer; MAX assist x1 for sit to stand, stand to sit, and stand pivot to commode transfers using vivek steady.   Physical Therapy Time and Intention   Total Session Time (sum of timed and untimed services) 30   Psychosocial Support   Trust Relationship/Rapport care explained;choices provided     Thank you for your referral,  Karel Garcia, PT, DPT    [facility]  O: [phone]  E: [email or web address]

## 2024-12-31 ENCOUNTER — APPOINTMENT (OUTPATIENT)
Dept: PHYSICAL THERAPY | Facility: CLINIC | Age: 74
DRG: 175 | End: 2024-12-31
Payer: MEDICARE

## 2024-12-31 LAB — UFH PPP CHRO-ACNC: >1.1 IU/ML

## 2024-12-31 PROCEDURE — 250N000013 HC RX MED GY IP 250 OP 250 PS 637: Performed by: STUDENT IN AN ORGANIZED HEALTH CARE EDUCATION/TRAINING PROGRAM

## 2024-12-31 PROCEDURE — 97530 THERAPEUTIC ACTIVITIES: CPT | Mod: GP | Performed by: PHYSICAL THERAPIST

## 2024-12-31 PROCEDURE — 99232 SBSQ HOSP IP/OBS MODERATE 35: CPT | Performed by: STUDENT IN AN ORGANIZED HEALTH CARE EDUCATION/TRAINING PROGRAM

## 2024-12-31 PROCEDURE — 36415 COLL VENOUS BLD VENIPUNCTURE: CPT | Performed by: STUDENT IN AN ORGANIZED HEALTH CARE EDUCATION/TRAINING PROGRAM

## 2024-12-31 PROCEDURE — 120N000001 HC R&B MED SURG/OB

## 2024-12-31 PROCEDURE — 250N000011 HC RX IP 250 OP 636: Performed by: STUDENT IN AN ORGANIZED HEALTH CARE EDUCATION/TRAINING PROGRAM

## 2024-12-31 PROCEDURE — 85520 HEPARIN ASSAY: CPT | Performed by: STUDENT IN AN ORGANIZED HEALTH CARE EDUCATION/TRAINING PROGRAM

## 2024-12-31 RX ORDER — ANASTROZOLE 1 MG/1
1 TABLET ORAL DAILY
Status: DISCONTINUED | OUTPATIENT
Start: 2025-01-01 | End: 2025-01-05 | Stop reason: HOSPADM

## 2024-12-31 RX ADMIN — CITALOPRAM HYDROBROMIDE 20 MG: 20 TABLET ORAL at 22:54

## 2024-12-31 RX ADMIN — LEVOTHYROXINE SODIUM 112 MCG: 112 TABLET ORAL at 07:52

## 2024-12-31 RX ADMIN — RIVAROXABAN 15 MG: 10 TABLET, FILM COATED ORAL at 09:22

## 2024-12-31 RX ADMIN — RIVAROXABAN 15 MG: 10 TABLET, FILM COATED ORAL at 17:45

## 2024-12-31 RX ADMIN — OXYBUTYNIN CHLORIDE 5 MG: 5 TABLET, EXTENDED RELEASE ORAL at 16:50

## 2024-12-31 RX ADMIN — CEFTRIAXONE SODIUM 2 G: 2 INJECTION, POWDER, FOR SOLUTION INTRAMUSCULAR; INTRAVENOUS at 14:31

## 2024-12-31 RX ADMIN — POLYETHYLENE GLYCOL 3350 17 G: 17 POWDER, FOR SOLUTION ORAL at 09:22

## 2024-12-31 RX ADMIN — MIRABEGRON 25 MG: 25 TABLET, FILM COATED, EXTENDED RELEASE ORAL at 09:22

## 2024-12-31 RX ADMIN — PANTOPRAZOLE SODIUM 40 MG: 40 TABLET, DELAYED RELEASE ORAL at 16:50

## 2024-12-31 ASSESSMENT — ACTIVITIES OF DAILY LIVING (ADL)
ADLS_ACUITY_SCORE: 82
ADLS_ACUITY_SCORE: 80
ADLS_ACUITY_SCORE: 82
ADLS_ACUITY_SCORE: 82
ADLS_ACUITY_SCORE: 80
ADLS_ACUITY_SCORE: 82
ADLS_ACUITY_SCORE: 80
ADLS_ACUITY_SCORE: 80
ADLS_ACUITY_SCORE: 82
ADLS_ACUITY_SCORE: 82
ADLS_ACUITY_SCORE: 80
ADLS_ACUITY_SCORE: 80
ADLS_ACUITY_SCORE: 82
ADLS_ACUITY_SCORE: 80
ADLS_ACUITY_SCORE: 80

## 2024-12-31 NOTE — PLAN OF CARE
"Goal Outcome Evaluation:      Plan of Care Reviewed With: patient, spouse    Overall Patient Progress: no changeOverall Patient Progress: no change     Pt is VSS on 3L O2. A&Ox4. Up in chair for meals. Transfers w/ 2A, vivek steady. BM x3. IV SL. Rocephin given. Tele NSR. Tele discontinued. Denies pain. Tamez patent w/ adequate urine output. Xarelto given.       BP 91/57 (BP Location: Left arm)   Pulse 72   Temp 98  F (36.7  C) (Oral)   Resp 20   Ht 1.702 m (5' 7\")   Wt 85 kg (187 lb 6.3 oz)   SpO2 94%   BMI 29.35 kg/m      "

## 2024-12-31 NOTE — PLAN OF CARE
Goal Outcome Evaluation:       Improving: Pt A&Ox4. VSS on 3L NC. Continues to have garbled speech but can make needs known. Repositioned every few hours overnight. Second dose of xarelto given last evening. LS clear, diminished. Plan for today is to be re-evaluated by PT if able with her standing device at home as her  needs her to be able to help pull herself up to transfer. He is also bringing her synthroid medication, as our pharmacy does not stock this specific brand. Tele remains NSR. Denies pain at this time.

## 2024-12-31 NOTE — PROGRESS NOTES
[facility]    Medicine Progress Note - Hospitalist Service    Date of Admission:  12/27/2024    Assessment & Plan   Anni Valverde is a 74-year-old with underlying multisystem atrophy with progressive motor and autonomic dysfunction to include neurogenic bladder (indwelling Tamez catheter), orthostatic hypotension, generalized weakness basically wheelchair-bound, stage Ia breast cancer on Arimidex (Anastrozole), anxiety, hypothyroid, previous history of left-sided hydronephrosis with duplicated left ureter, frequent UTIs on chronic Macrobid admitted on December 27 with acute hypoxemic respiratory failure secondary to multiple segmental pulmonary emboli and then found to also have an underlying urinary tract infection.    Acute hypoxemic respiratory failure secondary to bilateral pulmonary emboli, on 3 L/min via nasal cannula  Patient remains hypoxemic.  CT imaging consistent with bilateral PEs but no evidence on imaging of cardiac strain.  Echocardiogram is reassuring.  Patient was started on a heparin drip due to multiple risk factors including decreased to no mobility, underlying breast cancer and the use of Arimidex.  Transition to Xarelto 15 mg p.o. twice daily on 12/30/2024 for 21 days with plan to continue Xarelto 20 mg p.o. daily, duration to be determined outpatient  -- Discussed with primary oncologist office, okay to restart anastrozole, will restart anastrozole 1 mg daily starting on 1/1/2025  -- Continue to monitor and titrate oxygen down and off as able.  Respiratory therapy is following    Troponin elevation secondary to pulmonary emboli suspect this is demand type II ischemia  Echocardiogram reassuring.  No evidence of cardiac strain.  EKG without ST elevation concerns.    -- Has been on telemetry, no concerns  -- Discontinue telemetry today    Multiple system atrophy  Neurogenic bladder with indwelling Tamez catheter  Orthostatic hypotension  Profound debility and  weakness wheelchair-bound  Patient follows with [facility] neurology.  Spouse is primary caregiver and he is set up at home to assist the patient but she has to be able to participate in some of these activities and it is unclear if this will be possible once medically stable to discharge from the hospital.  Spouse and patient agreeable to skilled nursing facility placement if that is the recommendation.  -- Physical therapy worked with patient today recommendation is long-term care versus TCU  -- Case management consulted    Underlying breast cancer stage Ia  Status post lumpectomy  and is currently on Arimidex initially was held due to hypercoagulable state  -- reached out to primary oncologist to discuss restarting anastrozole, okay to restart anastrozole anytime  -- Restarting anastrozole 1/1/2025    History of recurrent urinary tract infections secondary to neurogenic bladder chronic Hays  UTI Klebsiella, UTI due to Indwelling Catheter   Previously on suppressive therapy with Macrobid, initially started on Zosyn, then de-escalated to Rocephin.   -- continue Rocephin, today is day #4 of antibiotic   -- Hays catheter changed in the emergency room  -- charge RN verified that hays was replaced in RN and then UA was collected, appreciate clarification     Hydronephrosis of left kidney chronic  Duplicated left ureter  CT imaging showed possibility for worsening.   Renal ultrasound reassuring.   Recommend follow up with Urology.     Anxiety  Panic disorder  Due to acute hypoxemic respiratory failure Ativan on hold to further avoid respiratory suppression.    --Continue Celexa    GERD  On Prilosec PTA currently on Protonix.    Hypothyroidism  -- Continue home dose of Synthroid, Synthroid 112 mcg daily  -- Last TSH was December 2, 2024, PCP is working on titrating dose of Synthroid, continue outpatient                Diet: Combination Diet Regular Diet Adult    DVT Prophylaxis: DOAC  Hays Catheter: PRESENT,  "indication: Other (Comment) (Chronic)  Lines: None     Cardiac Monitoring: None  Code Status: Full Code      Clinically Significant Risk Factors               # Hypoalbuminemia: Lowest albumin = 3.3 g/dL at 12/29/2024  5:08 AM, will monitor as appropriate                # Overweight: Estimated body mass index is 29.35 kg/m  as calculated from the following:    Height as of this encounter: 1.702 m (5' 7\").    Weight as of this encounter: 85 kg (187 lb 6.3 oz).             Social Drivers of Health            Disposition Plan     Medically Ready for Discharge: DC when placement is found             Brittany Regalado MD  Hospitalist Service  [facility]  Securely message with [employer] (more info)  Text page via [employer] Paging/Directory   ______________________________________________________________________    Interval History   Continues on 3 L nasal cannula.  No acute events overnight.  Plan to work with physical therapy this morning to assist with discharge planning    Physical Exam   Vital Signs: Temp: 98  F (36.7  C) Temp src: Oral BP: 91/57 (MAP70) Pulse: 72   Resp: 20 SpO2: 94 % O2 Device: Nasal cannula Oxygen Delivery: 3 LPM  Weight: 187 lbs 6.26 oz    General: Laying in bed, looks comfortable, very pleasant, supplemental oxygen  Respiratory: Lungs are clear to auscultation, no wheezing, no Rales  Cardiovascular: Regular rate and rhythm  GI: Soft, nondistended, bowel sounds present  GI: Indwelling Tamez catheter, chronic    Medical Decision Making       45 MINUTES SPENT BY ME on the date of service doing chart review, history, exam, documentation & further activities per the note.      Data         Imaging results reviewed over the past 24 hrs:   No results found for this or any previous visit (from the past 24 hours).  "

## 2025-01-01 ENCOUNTER — APPOINTMENT (OUTPATIENT)
Dept: PHYSICAL THERAPY | Facility: CLINIC | Age: 75
DRG: 175 | End: 2025-01-01
Payer: MEDICARE

## 2025-01-01 PROBLEM — C50.511 MALIGNANT NEOPLASM OF LOWER-OUTER QUADRANT OF RIGHT BREAST OF FEMALE, ESTROGEN RECEPTOR POSITIVE (H): Status: ACTIVE | Noted: 2025-01-01

## 2025-01-01 PROBLEM — J96.01 ACUTE RESPIRATORY FAILURE WITH HYPOXIA (H): Status: ACTIVE | Noted: 2024-12-27

## 2025-01-01 PROBLEM — N13.30 HYDRONEPHROSIS OF LEFT KIDNEY: Status: ACTIVE | Noted: 2023-07-15

## 2025-01-01 PROBLEM — I95.1 ORTHOSTATIC HYPOTENSION: Status: ACTIVE | Noted: 2022-02-17

## 2025-01-01 PROBLEM — G21.8 OTHER SECONDARY PARKINSONISM (H): Status: ACTIVE | Noted: 2022-02-17

## 2025-01-01 PROBLEM — N36.44 DETRUSOR SPHINCTER DYSSYNERGIA: Status: ACTIVE | Noted: 2023-08-30

## 2025-01-01 PROBLEM — G90.3 MULTIPLE SYSTEM ATROPHY (H): Status: ACTIVE | Noted: 2022-02-17

## 2025-01-01 PROBLEM — N32.81 OAB (OVERACTIVE BLADDER): Status: ACTIVE | Noted: 2023-01-03

## 2025-01-01 PROBLEM — Z85.3 PERSONAL HISTORY OF MALIGNANT NEOPLASM OF BREAST: Status: ACTIVE | Noted: 2024-12-27

## 2025-01-01 PROBLEM — M81.0 OSTEOPOROSIS: Status: ACTIVE | Noted: 2024-12-27

## 2025-01-01 PROBLEM — R39.9 LOWER URINARY TRACT SYMPTOMS (LUTS): Status: ACTIVE | Noted: 2022-09-27

## 2025-01-01 PROBLEM — Z17.0 MALIGNANT NEOPLASM OF LOWER-OUTER QUADRANT OF RIGHT BREAST OF FEMALE, ESTROGEN RECEPTOR POSITIVE (H): Status: ACTIVE | Noted: 2025-01-01

## 2025-01-01 PROBLEM — N32.3 BLADDER DIVERTICULUM: Status: ACTIVE | Noted: 2023-07-15

## 2025-01-01 PROBLEM — I26.99 BILATERAL PULMONARY EMBOLISM (H): Status: ACTIVE | Noted: 2024-12-27

## 2025-01-01 PROBLEM — N31.9 NEUROGENIC BLADDER: Status: ACTIVE | Noted: 2022-02-17

## 2025-01-01 PROBLEM — G23.8 MULTIPLE SYSTEM ATROPHY (H): Status: ACTIVE | Noted: 2022-02-17

## 2025-01-01 PROBLEM — Q62.5 DUPLICATED URETER, LEFT: Status: ACTIVE | Noted: 2023-08-09

## 2025-01-01 PROBLEM — R33.9 INCOMPLETE BLADDER EMPTYING: Status: ACTIVE | Noted: 2023-01-03

## 2025-01-01 PROCEDURE — 99232 SBSQ HOSP IP/OBS MODERATE 35: CPT | Performed by: STUDENT IN AN ORGANIZED HEALTH CARE EDUCATION/TRAINING PROGRAM

## 2025-01-01 PROCEDURE — 120N000001 HC R&B MED SURG/OB

## 2025-01-01 PROCEDURE — 250N000013 HC RX MED GY IP 250 OP 250 PS 637: Performed by: STUDENT IN AN ORGANIZED HEALTH CARE EDUCATION/TRAINING PROGRAM

## 2025-01-01 PROCEDURE — 97530 THERAPEUTIC ACTIVITIES: CPT | Mod: GP | Performed by: PHYSICAL THERAPIST

## 2025-01-01 PROCEDURE — 250N000011 HC RX IP 250 OP 636: Performed by: STUDENT IN AN ORGANIZED HEALTH CARE EDUCATION/TRAINING PROGRAM

## 2025-01-01 RX ORDER — CEFTRIAXONE 2 G/1
2 INJECTION, POWDER, FOR SOLUTION INTRAMUSCULAR; INTRAVENOUS EVERY 24 HOURS
Status: COMPLETED | OUTPATIENT
Start: 2025-01-01 | End: 2025-01-03

## 2025-01-01 RX ADMIN — RIVAROXABAN 15 MG: 10 TABLET, FILM COATED ORAL at 09:13

## 2025-01-01 RX ADMIN — LEVOTHYROXINE SODIUM 112 MCG: 112 TABLET ORAL at 08:32

## 2025-01-01 RX ADMIN — PANTOPRAZOLE SODIUM 40 MG: 40 TABLET, DELAYED RELEASE ORAL at 16:51

## 2025-01-01 RX ADMIN — CITALOPRAM HYDROBROMIDE 20 MG: 20 TABLET ORAL at 21:26

## 2025-01-01 RX ADMIN — ANASTROZOLE 1 MG: 1 TABLET, COATED ORAL at 09:13

## 2025-01-01 RX ADMIN — RIVAROXABAN 15 MG: 10 TABLET, FILM COATED ORAL at 18:45

## 2025-01-01 RX ADMIN — CEFTRIAXONE SODIUM 2 G: 2 INJECTION, POWDER, FOR SOLUTION INTRAMUSCULAR; INTRAVENOUS at 13:17

## 2025-01-01 RX ADMIN — OXYBUTYNIN CHLORIDE 5 MG: 5 TABLET, EXTENDED RELEASE ORAL at 16:51

## 2025-01-01 RX ADMIN — MIRABEGRON 25 MG: 25 TABLET, FILM COATED, EXTENDED RELEASE ORAL at 09:13

## 2025-01-01 ASSESSMENT — ACTIVITIES OF DAILY LIVING (ADL)
ADLS_ACUITY_SCORE: 82
ADLS_ACUITY_SCORE: 86
ADLS_ACUITY_SCORE: 82
ADLS_ACUITY_SCORE: 86
ADLS_ACUITY_SCORE: 82
ADLS_ACUITY_SCORE: 86
ADLS_ACUITY_SCORE: 82
ADLS_ACUITY_SCORE: 86
ADLS_ACUITY_SCORE: 82
ADLS_ACUITY_SCORE: 86

## 2025-01-01 NOTE — PLAN OF CARE
Goal Outcome Evaluation:      Plan of Care Reviewed With: patient    Overall Patient Progress: no changeOverall Patient Progress: no change    Outcome Evaluation: PAtient alert and oriented, continued on 3L nasal cannula, sats 91-93%. Garbled speech at baseline, repositioned Q2-3H overnight. Lungs diminished. Good UOP from hays, cath wipes completed in evening. Plan is placement TCU vs LTC, patient has been unable to exert strength to stand with home lift device or vivek steady here without significant A2.    Patient's home med synthroid is empty, patient reports family usually brings in.

## 2025-01-01 NOTE — PROGRESS NOTES
[facility]    Medicine Progress Note - Hospitalist Service    Date of Admission:  12/27/2024    Assessment & Plan   Anni Valverde is a 74-year-old with underlying multisystem atrophy with progressive motor and autonomic dysfunction to include neurogenic bladder (indwelling Tamez catheter), orthostatic hypotension, generalized weakness basically wheelchair-bound, stage Ia breast cancer on Arimidex (Anastrozole), anxiety, hypothyroid, previous history of left-sided hydronephrosis with duplicated left ureter, frequent UTIs on chronic Macrobid admitted on December 27 with acute hypoxemic respiratory failure secondary to multiple segmental pulmonary emboli, also have an underlying urinary tract infection.    Acute hypoxemic respiratory failure secondary to bilateral pulmonary emboli, on 3 L/min via nasal cannula, continues on 3L/min  CT imaging consistent with bilateral Pes, no evidence on imaging of cardiac strain.  Echocardiogram is reassuring.  Patient was started on a heparin drip initially, transitioned to Xarelto 15 mg p.o. twice daily on 12/30/2024 for 21 days with plan to continue Xarelto 20 mg p.o. daily, duration to be determined outpatient.  -- discussed with [facility] hematology oncology in [facility] ([phone]), in agreement with starting Xarelto  -- Continue to monitor and titrate oxygen down and off as able    Troponin elevation secondary to pulmonary emboli suspect this is demand type II ischemia  Echocardiogram reassuring.  No evidence of cardiac strain.  EKG without ST elevation concerns.    -- Has been on telemetry, no concerns  -- Telemetry discontinued on 12/31/2024    Multiple system atrophy  Neurogenic bladder with indwelling Tamez catheter  Orthostatic hypotension  Profound debility and weakness wheelchair-bound  Patient follows with [facility] neurology.  Spouse is primary caregiver and he is set up at home to assist the patient but  she has to be able to participate in some of these activities   Patient continues to be weaker than baseline, and in general has been getting progressively weaker, likely secondary to underlying multiple chronic conditions  -- Physical therapy worked with patient today recommendation is long-term care versus TCU  -- Case management consulted on 12/31/2024, case management is aware.  Due to the new year holiday, there is no  in house today.  -- Patient is medically ready to be discharged to a long-term care facility versus TCU whenever placement is found    Underlying breast cancer stage Ia  Status post lumpectomy  and is currently on Arimidex. Initially was held due to hypercoagulable state.  -- Discussed with primary oncologist office, okay to restart anastrozole, will restart anastrozole 1 mg daily starting on 1/1/2025 (today)    History of recurrent urinary tract infections secondary to neurogenic bladder chronic Hays  UTI Klebsiella, UTI due to Indwelling Catheter   Neurogenic bladder  Previously on suppressive therapy with Macrobid, initially started on Zosyn, then de-escalated to Rocephin  Urine culture growing more than 100,000 CFU's per milliliter of Klebsiella pneumoniae  -- continue Rocephin, today is day #5 of antibiotics, recommend 7-day course  -- Hays catheter changed in the emergency room  -- Continue chronic Hays catheter care  -- charge RN verified that hays was replaced in RN and then UA was collected, appreciate clarification     -- Continue home dosing of Mirabegron 25 mg daily   -- Continue home dosing of oxybutynin ER 5 mg daily before supper    Hydronephrosis of left kidney chronic  Duplicated left ureter  CT imaging showed possibility for worsening.   Renal ultrasound complete on 12/27/2024 was reassuring --normal kidney ultrasound.  No evidence of hydronephrosis  Continue follow-up with urology outpatient    Anxiety  Panic disorder  Due to acute hypoxemic respiratory failure  "Ativan on hold to further avoid respiratory suppression.  Patient has not required Ativan required or requested Ativan.  --Continue Celexa    GERD  On Prilosec PTA  --Protonix 40 mg p.o. daily before supper  -- Tums 1000 mg 4 times daily as needed for heartburn    Hypothyroidism  -- Continue home dose of Synthroid, Synthroid 112 mcg daily  -- Last TSH was December 2, 2024, PCP is working on titrating dose of Synthroid, continue outpatient    Disposition:  -- Patient is medically ready to discharge to a long-term care facility versus TCU, would need to discharge with oxygen at this time  -- Case management consulted on 12/31/2024. Due to the new year holiday, there is no  in house today.  -- Patient is medically ready to be discharged to a long-term care facility versus TCU whenever placement is found      Diet: Combination Diet Regular Diet Adult    DVT Prophylaxis: DOAC  Tamez Catheter: PRESENT, indication: Other (Comment) (chronic)  Lines: None     Cardiac Monitoring: None  Code Status: Full Code      Clinically Significant Risk Factors               # Hypoalbuminemia: Lowest albumin = 3.3 g/dL at 12/29/2024  5:08 AM, will monitor as appropriate                # Overweight: Estimated body mass index is 29.14 kg/m  as calculated from the following:    Height as of this encounter: 1.702 m (5' 7\").    Weight as of this encounter: 84.4 kg (186 lb 1.1 oz).             Social Drivers of Health            Disposition Plan     Medically Ready for Discharge: Ready Now             Brittany Regalado MD  Hospitalist Service  [facility]  Securely message with [employer] (more info)  Text page via [facility] Paging/Directory   ______________________________________________________________________    Interval History   Anni tells me that she is doing well.  Breathing comfortably, denies pain.  Denies fever chills.    Discussed review of hospitalization so far with  and " daughter.  Questions answered.      Physical Exam   Vital Signs: Temp: 97.8  F (36.6  C) Temp src: Oral BP: (!) 145/76 Pulse: 71   Resp: 20 SpO2: 94 % O2 Device: Nasal cannula Oxygen Delivery: 3 LPM  Weight: 186 lbs 1.09 oz    General: Sitting up in chair, looks comfortable, very pleasant, on nasal cannula 4 supplemental oxygen  Respiratory: Patient is not able to expand lungs completely, breaths are shallow, somewhat diminished air movement  Cardiovascular: Regular rate and rhythm  GI: Soft, nondistended, bowel sounds present  GI: Indwelling Tamez catheter, chronic    Medical Decision Making       45 MINUTES SPENT BY ME on the date of service doing chart review, history, exam, documentation & further activities per the note.      Data         Imaging results reviewed over the past 24 hrs:   No results found for this or any previous visit (from the past 24 hours).

## 2025-01-02 ENCOUNTER — APPOINTMENT (OUTPATIENT)
Dept: GENERAL RADIOLOGY | Facility: CLINIC | Age: 75
DRG: 175 | End: 2025-01-02
Attending: PEDIATRICS
Payer: MEDICARE

## 2025-01-02 VITALS
OXYGEN SATURATION: 92 % | HEIGHT: 67 IN | SYSTOLIC BLOOD PRESSURE: 106 MMHG | DIASTOLIC BLOOD PRESSURE: 47 MMHG | RESPIRATION RATE: 20 BRPM | TEMPERATURE: 97.6 F | BODY MASS INDEX: 29.31 KG/M2 | WEIGHT: 186.73 LBS | HEART RATE: 75 BPM

## 2025-01-02 LAB
ANION GAP SERPL CALCULATED.3IONS-SCNC: 11 MMOL/L (ref 7–15)
BUN SERPL-MCNC: 12.9 MG/DL (ref 8–23)
CALCIUM SERPL-MCNC: 9.4 MG/DL (ref 8.8–10.4)
CHLORIDE SERPL-SCNC: 102 MMOL/L (ref 98–107)
CREAT SERPL-MCNC: 0.87 MG/DL (ref 0.51–0.95)
EGFRCR SERPLBLD CKD-EPI 2021: 70 ML/MIN/1.73M2
ERYTHROCYTE [DISTWIDTH] IN BLOOD BY AUTOMATED COUNT: 12 % (ref 10–15)
GLUCOSE SERPL-MCNC: 96 MG/DL (ref 70–99)
HCO3 SERPL-SCNC: 25 MMOL/L (ref 22–29)
HCT VFR BLD AUTO: 35.4 % (ref 35–47)
HGB BLD-MCNC: 11.9 G/DL (ref 11.7–15.7)
MCH RBC QN AUTO: 30.4 PG (ref 26.5–33)
MCHC RBC AUTO-ENTMCNC: 33.6 G/DL (ref 31.5–36.5)
MCV RBC AUTO: 91 FL (ref 78–100)
PLATELET # BLD AUTO: 267 10E3/UL (ref 150–450)
POTASSIUM SERPL-SCNC: 4 MMOL/L (ref 3.4–5.3)
RBC # BLD AUTO: 3.91 10E6/UL (ref 3.8–5.2)
SODIUM SERPL-SCNC: 138 MMOL/L (ref 135–145)
WBC # BLD AUTO: 4.3 10E3/UL (ref 4–11)

## 2025-01-02 PROCEDURE — 250N000011 HC RX IP 250 OP 636: Performed by: STUDENT IN AN ORGANIZED HEALTH CARE EDUCATION/TRAINING PROGRAM

## 2025-01-02 PROCEDURE — 250N000013 HC RX MED GY IP 250 OP 250 PS 637: Performed by: STUDENT IN AN ORGANIZED HEALTH CARE EDUCATION/TRAINING PROGRAM

## 2025-01-02 PROCEDURE — 71045 X-RAY EXAM CHEST 1 VIEW: CPT

## 2025-01-02 PROCEDURE — 120N000001 HC R&B MED SURG/OB

## 2025-01-02 PROCEDURE — 85014 HEMATOCRIT: CPT | Performed by: STUDENT IN AN ORGANIZED HEALTH CARE EDUCATION/TRAINING PROGRAM

## 2025-01-02 PROCEDURE — 80048 BASIC METABOLIC PNL TOTAL CA: CPT | Performed by: STUDENT IN AN ORGANIZED HEALTH CARE EDUCATION/TRAINING PROGRAM

## 2025-01-02 PROCEDURE — 999N000157 HC STATISTIC RCP TIME EA 10 MIN

## 2025-01-02 PROCEDURE — 99232 SBSQ HOSP IP/OBS MODERATE 35: CPT | Performed by: STUDENT IN AN ORGANIZED HEALTH CARE EDUCATION/TRAINING PROGRAM

## 2025-01-02 PROCEDURE — 999N000156 HC STATISTIC RCP CONSULT EA 30 MIN

## 2025-01-02 PROCEDURE — 85049 AUTOMATED PLATELET COUNT: CPT | Performed by: STUDENT IN AN ORGANIZED HEALTH CARE EDUCATION/TRAINING PROGRAM

## 2025-01-02 PROCEDURE — 36415 COLL VENOUS BLD VENIPUNCTURE: CPT | Performed by: STUDENT IN AN ORGANIZED HEALTH CARE EDUCATION/TRAINING PROGRAM

## 2025-01-02 PROCEDURE — 999N000123 HC STATISTIC OXYGEN O2DAILY TECH TIME

## 2025-01-02 RX ADMIN — LEVOTHYROXINE SODIUM 112 MCG: 112 TABLET ORAL at 06:54

## 2025-01-02 RX ADMIN — RIVAROXABAN 15 MG: 10 TABLET, FILM COATED ORAL at 08:09

## 2025-01-02 RX ADMIN — OXYBUTYNIN CHLORIDE 5 MG: 5 TABLET, EXTENDED RELEASE ORAL at 16:01

## 2025-01-02 RX ADMIN — CEFTRIAXONE SODIUM 2 G: 2 INJECTION, POWDER, FOR SOLUTION INTRAMUSCULAR; INTRAVENOUS at 14:16

## 2025-01-02 RX ADMIN — RIVAROXABAN 15 MG: 10 TABLET, FILM COATED ORAL at 17:46

## 2025-01-02 RX ADMIN — ANASTROZOLE 1 MG: 1 TABLET, COATED ORAL at 08:08

## 2025-01-02 RX ADMIN — MIRABEGRON 25 MG: 25 TABLET, FILM COATED, EXTENDED RELEASE ORAL at 08:08

## 2025-01-02 RX ADMIN — CITALOPRAM HYDROBROMIDE 20 MG: 20 TABLET ORAL at 21:06

## 2025-01-02 RX ADMIN — PANTOPRAZOLE SODIUM 40 MG: 40 TABLET, DELAYED RELEASE ORAL at 16:01

## 2025-01-02 ASSESSMENT — ACTIVITIES OF DAILY LIVING (ADL)
ADLS_ACUITY_SCORE: 82
ADLS_ACUITY_SCORE: 86
ADLS_ACUITY_SCORE: 82
ADLS_ACUITY_SCORE: 90
DEPENDENT_IADLS:: CLEANING;COOKING;LAUNDRY;SHOPPING;MEAL PREPARATION;MEDICATION MANAGEMENT;MONEY MANAGEMENT;TRANSPORTATION;INCONTINENCE
ADLS_ACUITY_SCORE: 86
ADLS_ACUITY_SCORE: 90
ADLS_ACUITY_SCORE: 82
ADLS_ACUITY_SCORE: 90
ADLS_ACUITY_SCORE: 86
ADLS_ACUITY_SCORE: 90
ADLS_ACUITY_SCORE: 82
ADLS_ACUITY_SCORE: 86
ADLS_ACUITY_SCORE: 86
ADLS_ACUITY_SCORE: 82
ADLS_ACUITY_SCORE: 86
ADLS_ACUITY_SCORE: 90

## 2025-01-02 NOTE — CONSULTS
SPIRITUAL HEALTH SERVICES Consult Note    Medical Surgical Unit at [facility]      REFERRAL SOURCE/REASON FOR VISIT - I stopped in to visit Anni per length of stay.      SUMMARY -  Anni has been hospitalized for 6 days.  I discovered a number of family members who were with Anni, meeting with Care Management at the time, so I did not stay for a visit.         Plan - I will continue to follow patient and will visit her if able, but will be available for any ongoing support needs until her discharge.     Bashir Garner, Ph.D,   Spiritual Health Services  [address] [place] [address] [phone]  [email or web address]    Assessment -     Strengths, Coping, and Resources - spouse, family, wan    Meaning, Beliefs, and Spirituality - Patient reported at admission that she is Mandaeism.

## 2025-01-02 NOTE — CONSULTS
Care Management Initial Consult    General Information  Assessment completed with: Patient, Spouse or significant other, Children, Family,    Type of CM/SW Visit: Initial Assessment    Primary Care Provider verified and updated as needed: Yes   Readmission within the last 30 days:        Reason for Consult: discharge planning  Advance Care Planning: Advance Care Planning Reviewed: no concerns identified        Communication Assessment  Patient's communication style: spoken language (English or Bilingual)    Hearing Difficulty or Deaf: no   Wear Glasses or Blind: yes    Cognitive  Cognitive/Neuro/Behavioral: .WDL except  Level of Consciousness: alert  Arousal Level: opens eyes spontaneously  Orientation: oriented x 4  Mood/Behavior: calm, cooperative  Best Language: 1 - Mild to moderate  Speech: garbled    Living Environment:   People in home: spouse     Current living Arrangements: house      Able to return to prior arrangements: no  Living Arrangement Comments: needs LTC with therapies    Family/Social Support:  Care provided by: spouse/significant other  Provides care for: no one, unable/limited ability to care for self  Marital Status:   Support system: , Children  Abran       Description of Support System: Involved, Supportive    Support Assessment: Adequate family and caregiver support    Current Resources:   Patient receiving home care services: No      Community Resources: None  Equipment currently used at home: grab bar, tub/shower, lift device, commode chair, tub bench  Supplies currently used at home:      Employment/Financial:  Employment Status: retired        Financial Concerns: none   Referral to Financial Worker: No     Does the patient's insurance plan have a 3 day qualifying hospital stay waiver?  No    Lifestyle & Psychosocial Needs:  Social Drivers of Health     Food Insecurity: Low Risk  (12/27/2024)    Food Insecurity     Within the past 12 months, did you worry that your food would  run out before you got money to buy more?: No     Within the past 12 months, did the food you bought just not last and you didn t have money to get more?: No   Depression: Not at risk (10/17/2024)    Received from [facility]    PHQ-2     PHQ-2 Score: 0   Housing Stability: Low Risk  (12/27/2024)    Housing Stability     Do you have housing? : Yes     Are you worried about losing your housing?: No   Tobacco Use: Low Risk  (10/17/2024)    Received from [facility]    Patient History     Smoking Tobacco Use: Never     Smokeless Tobacco Use: Never     Passive Exposure: Never   Financial Resource Strain: Low Risk  (12/27/2024)    Financial Resource Strain     Within the past 12 months, have you or your family members you live with been unable to get utilities (heat, electricity) when it was really needed?: No   Alcohol Use: Not on file   Transportation Needs: Low Risk  (12/27/2024)    Transportation Needs     Within the past 12 months, has lack of transportation kept you from medical appointments, getting your medicines, non-medical meetings or appointments, work, or from getting things that you need?: No   Physical Activity: Not on file   Interpersonal Safety: Low Risk  (12/27/2024)    Interpersonal Safety     Do you feel physically and emotionally safe where you currently live?: Yes     Within the past 12 months, have you been hit, slapped, kicked or otherwise physically hurt by someone?: No     Within the past 12 months, have you been humiliated or emotionally abused in other ways by your partner or ex-partner?: No   Stress: Not on file   Social Connections: Not on file   Health Literacy: Not on file       Functional Status:  Prior to admission patient needed assistance:   Dependent ADLs:: Bathing, Dressing, Eating, Grooming, Incontinence, Positioning, Transfers, Wheelchair-with assist, Toileting  Dependent IADLs:: Cleaning, Cooking, Laundry, Shopping, Meal Preparation, Medication Management, Money Management,  "Transportation, Incontinence       Mental Health Status:  Mental Health Status: No Current Concerns       Chemical Dependency Status:  Chemical Dependency Status: No Current Concerns             Values/Beliefs:  Spiritual, Cultural Beliefs, Zoroastrianism Practices, Values that affect care: no             Discussed  Partnership in Safe Discharge Planning  document with patient/family: No    Additional Information:  Consult received for discharge planning. Initial assessment completed with patient, , Abran, daughter and grand-daughter, at bedside.    Patient lives at home with Abran. Abran has been providing 24/7 total cares for patient. Patient is usually able to assist Abran with a 'vivek-steady\" type device for transfers. On 12/27, patient became more weak and was not able to assist with transfers. Abran not able to get patient up after several attempts.   Patient does not received any in-home services. She is not on Home O2. She is wheelchair bound at baseline.     After lengthy discussion with family, with patient's current care needs, she will need LTC with therapies. Abran is hoping patient will get physically strong enough at Rehab to return home, however, he is realistic that her disease is progressive and she may need a higher level of care long term.     Family in agreement with referrals being placed for Long Term Care with therapies. Abran would like patient as close to home as possible, however, daughter lives in [place], so if needed referrals closer to that area are ok too.     Numerous referrals sent. Please refer to the Care Management Summary for detailed list.     Patient will need agency transport at discharge.    JESSENIA Lopez  [facility] [phone]/ [facility] [phone]  Care Management          "

## 2025-01-02 NOTE — PLAN OF CARE
"Goal Outcome Evaluation:  Pt is A&Ox4; VSS on 2L. O2 weaned from 3L to 2L. Denies pain. LS diminished. Pt up in chair for meals,  transfers w/ vivek steady A2/gb. BM x2. Tamez is patent w/ good urine output. Pt has good appetite. Anastrozole restarted today.        /55 (BP Location: Left arm)   Pulse 71   Temp 98.7  F (37.1  C) (Oral)   Resp 20   Ht 1.702 m (5' 7\")   Wt 84.4 kg (186 lb 1.1 oz)   SpO2 99%   BMI 29.14 kg/m                     "

## 2025-01-02 NOTE — PROGRESS NOTES
[facility]    Medicine Progress Note - Hospitalist Service    Date of Admission:  12/27/2024    Assessment & Plan   Anni Valverde is a 74-year-old with underlying multisystem atrophy with progressive motor and autonomic dysfunction to include neurogenic bladder (indwelling Tamez catheter), orthostatic hypotension, generalized weakness basically wheelchair-bound, stage Ia breast cancer on Arimidex (Anastrozole), anxiety, hypothyroid, previous history of left-sided hydronephrosis with duplicated left ureter, frequent UTIs on chronic Macrobid admitted on December 27 with acute hypoxemic respiratory failure secondary to multiple segmental pulmonary emboli, also have an underlying urinary tract infection.    Acute hypoxemic respiratory failure, multifactorial, combination of bilateral pulmonary emboli, atelectasis, low lung volumes, increased oxygen demand, was increased to 6 L a minute via nasal cannula   Bilateral pulmonary emboli   Atelectasis  Low lung volumes  On admission, CT imaging consistent with bilateral PEs, no evidence on imaging of cardiac strain.  Echocardiogram is reassuring.    Patient was started on a heparin drip initially, transitioned to Xarelto 15 mg p.o. twice daily on 12/30/2024 for 21 days with plan to continue Xarelto 20 mg p.o. daily, duration to be determined outpatient.  -- discussed with [facility] hematology oncology in [facility] ([phone]), in agreement with starting Xarelto  -- Continue to monitor and titrate oxygen down and off as able    Patient did have increased oxygen needs this morning.  Chest x-ray was done and reviewed.  Low volume volumes.  Worsening atelectasis at the right lung base.  Worsening patchy left basilar opacity which may represent atelectasis and/or infection.  Clinically, infection is less likely.  There is no fever, no new upper respiratory symptoms, no increase in WBC  More likely that worsening hypoxia  due to atelectasis  -- Encouraged to use incentive spirometer as much as possible, did some practice with incentive spirometry  -- Weaned down oxygen to 4 L a minute and during the rest of our conversation in the room, patient remained above 90%, leaving oxygen on at 4 L a minute, updated RN    Troponin elevation secondary to pulmonary emboli suspect this is demand type II ischemia  Echocardiogram reassuring.  No evidence of cardiac strain.  EKG without ST elevation concerns.    -- Has been on telemetry, no concerns  -- Telemetry discontinued on 12/31/2024    Multiple system atrophy  Neurogenic bladder with indwelling Tamez catheter  Orthostatic hypotension  Profound debility and weakness wheelchair-bound  Patient follows with [facility] neurology.  Spouse is primary caregiver and he is set up at home to assist the patient but she has to be able to participate in some of these activities   Patient continues to be weaker than baseline, and in general has been getting progressively weaker, likely secondary to underlying multiple chronic conditions  -- Physical therapy worked with patient today recommendation is long-term care versus TCU  -- Case management consulted on 12/31/2024, case management is aware.  Due to the new year holiday, there is no  in house today.  -- Patient is medically ready to be discharged to a long-term care facility versus TCU whenever placement is found    Underlying breast cancer stage Ia  Status post lumpectomy  and is currently on Arimidex. Initially was held due to hypercoagulable state.  -- Discussed with primary oncologist office, okay to restart anastrozole, restarted anastrozole 1 mg daily starting on 1/1/2025 (today)    History of recurrent urinary tract infections secondary to neurogenic bladder chronic Tamez  UTI Klebsiella, UTI due to Indwelling Catheter   Neurogenic bladder  Previously on suppressive therapy with Macrobid, initially started on Zosyn, then  de-escalated to Rocephin  Urine culture growing more than 100,000 CFU's per milliliter of Klebsiella pneumoniae  -- continue Rocephin, today is day #6 of antibiotics, recommend 7-day course, stop on 1/3/2025  -- Hays catheter changed in the emergency room  -- Continue chronic Hays catheter care  -- charge RN verified that hays was replaced in RN and then UA was collected, appreciate clarification     -- Continue home dosing of Mirabegron 25 mg daily   -- Continue home dosing of oxybutynin ER 5 mg daily before supper    Hydronephrosis of left kidney chronic  Duplicated left ureter  CT imaging showed possibility for worsening.   Renal ultrasound complete on 12/27/2024 was reassuring --normal kidney ultrasound.  No evidence of hydronephrosis  Continue follow-up with urology outpatient    Anxiety  Panic disorder  Due to acute hypoxemic respiratory failure Ativan on hold to further avoid respiratory suppression.  Patient has not required Ativan required or requested Ativan.  --Continue Celexa    GERD  On Prilosec PTA  --Protonix 40 mg p.o. daily before supper  -- Tums 1000 mg 4 times daily as needed for heartburn    Hypothyroidism  -- Continue home dose of Synthroid, Synthroid 112 mcg daily  -- Last TSH was December 2, 2024, PCP is working on titrating dose of Synthroid, continue outpatient    Disposition:  -- Patient is medically ready to discharge to a long-term care facility versus TCU, would need to discharge with oxygen at this time  -- Case management consulted on 12/31/2024.   -- Patient is medically ready to be discharged to a long-term care facility versus TCU whenever placement is found      Diet: Combination Diet Regular Diet Adult    DVT Prophylaxis: DOAC  Hays Catheter: PRESENT, indication: Other (Comment) (chronic)  Lines: None     Cardiac Monitoring: None  Code Status: Full Code            Diet: Combination Diet Regular Diet Adult    DVT Prophylaxis: DOAC  Hays Catheter: PRESENT, indication: Other  "(Comment)  Lines: None     Cardiac Monitoring: None  Code Status: Full Code      Clinically Significant Risk Factors               # Hypoalbuminemia: Lowest albumin = 3.3 g/dL at 12/29/2024  5:08 AM, will monitor as appropriate                # Overweight: Estimated body mass index is 29.25 kg/m  as calculated from the following:    Height as of this encounter: 1.702 m (5' 7\").    Weight as of this encounter: 84.7 kg (186 lb 11.7 oz).      # Financial/Environmental Concerns: none         Social Drivers of Health            Disposition Plan     Medically Ready for Discharge: Ready Now             Brittany Regalado MD  Hospitalist Service  [facility]  Securely message with [employer] (more info)  Text page via [employer] Paging/Directory   ______________________________________________________________________    Interval History   No acute events overnight.  Patient was weaned down from 3 L to 2 L.  In the morning, patient was sitting in her reclining chair, and family noted that saturations decreasing, down to 87 while patient was on 2 L a minute of oxygen via nasal cannula, oxygen increased up to 6 L  Anni is feeling well today. She was moved to the bed and saturations improved (along with increasing the O2)   We worked on incentive spirometer breathing  Anni denies any new cough, no new chills, no new respiratory symptoms    Physical Exam   Vital Signs: Temp: 97.5  F (36.4  C) Temp src: Oral BP: 127/73 Pulse: 63   Resp: 22 SpO2: 94 % O2 Device: Nasal cannula Oxygen Delivery: 4 LPM  Weight: 186 lbs 11.67 oz    General: Laying in bed, looks comfortable, very pleasant, on nasal cannula 6 supplemental oxygen  Respiratory: Lungs listened to in the front today, patient is not able to expand lungs completely, breaths are shallow, somewhat diminished air movement, much improved air movement with incentive spirometry  Cardiovascular: Regular rate and rhythm  GI: Indwelling Tamez catheter, " chronic    Medical Decision Making       40 MINUTES SPENT BY ME on the date of service doing chart review, history, exam, documentation & further activities per the note.      Data   ------------------------- PAST 24 HR DATA REVIEWED -----------------------------------------------    I have personally reviewed the following data over the past 24 hrs:    4.3  \   11.9   / 267     138 102 12.9 /  96   4.0 25 0.87 \       Imaging results reviewed over the past 24 hrs:   Recent Results (from the past 24 hours)   XR Chest Port 1 View    Narrative    CHEST ONE VIEW  1/2/2025 2:54 PM     HISTORY: recent PE with resp failure and acute on chronic debility,  worsening oxygenation, ?infiltrates/atelectasis    COMPARISON: Chest x-ray 12/27/2024.      Impression    IMPRESSION: Stable cardiac silhouette. Low lung volumes. Worsening  platelike atelectasis of the right lung base. Worsening patchy left  basilar opacity which may represent atelectasis and/or pneumonia.  Trace left pleural effusion. No pneumothorax.    FERNANDO VILLAVICENCIO MD         SYSTEM ID:  [id number]

## 2025-01-02 NOTE — PLAN OF CARE
Goal Outcome Evaluation:      Plan of Care Reviewed With: patient    Overall Patient Progress: no changeOverall Patient Progress: no change    Outcome Evaluation: Pt A&O VSS except O2 NC 2-6L needed to keep >90. Moving A2 with sera steady and gb. Pt ability to stand is quite poor. Using bedside commode. incontinent of stool x2. Chronic hays in place clear candy output. Incentive spirometer use encouraged, chest x-ray repeated. Pt and family met with care coordination about discharge options.  Temp: 97.5  F (36.4  C) Temp src: Oral BP: 127/73 Pulse: 63   Resp: 22 SpO2: 94 % O2 Device: Nasal cannula Oxygen Delivery: 4 LPM

## 2025-01-02 NOTE — PLAN OF CARE
Goal Outcome Evaluation:      Plan of Care Reviewed With: patient, spouse, family          Outcome Evaluation: LTC with therapies

## 2025-01-02 NOTE — PLAN OF CARE
Goal Outcome Evaluation:      Plan of Care Reviewed With: patient    Overall Patient Progress: no changeOverall Patient Progress: no change    Outcome Evaluation: Vss. O2 at 2L nc with O2 sats mid 90s. Denies pain. Denies SOA. Turned and repositioned every two hours. Lungs are diminished but clear. Chronic hays catheter with adequate UOP.

## 2025-01-03 ENCOUNTER — APPOINTMENT (OUTPATIENT)
Dept: PHYSICAL THERAPY | Facility: CLINIC | Age: 75
DRG: 175 | End: 2025-01-03
Payer: MEDICARE

## 2025-01-03 PROCEDURE — 258N000003 HC RX IP 258 OP 636: Performed by: INTERNAL MEDICINE

## 2025-01-03 PROCEDURE — 120N000001 HC R&B MED SURG/OB

## 2025-01-03 PROCEDURE — 97530 THERAPEUTIC ACTIVITIES: CPT | Mod: GP | Performed by: PHYSICAL THERAPIST

## 2025-01-03 PROCEDURE — 99232 SBSQ HOSP IP/OBS MODERATE 35: CPT | Performed by: STUDENT IN AN ORGANIZED HEALTH CARE EDUCATION/TRAINING PROGRAM

## 2025-01-03 PROCEDURE — 250N000013 HC RX MED GY IP 250 OP 250 PS 637: Performed by: STUDENT IN AN ORGANIZED HEALTH CARE EDUCATION/TRAINING PROGRAM

## 2025-01-03 PROCEDURE — 250N000011 HC RX IP 250 OP 636: Performed by: STUDENT IN AN ORGANIZED HEALTH CARE EDUCATION/TRAINING PROGRAM

## 2025-01-03 RX ADMIN — OXYBUTYNIN CHLORIDE 5 MG: 5 TABLET, EXTENDED RELEASE ORAL at 16:00

## 2025-01-03 RX ADMIN — SODIUM CHLORIDE 500 ML: 9 INJECTION, SOLUTION INTRAVENOUS at 22:57

## 2025-01-03 RX ADMIN — ANASTROZOLE 1 MG: 1 TABLET, COATED ORAL at 08:07

## 2025-01-03 RX ADMIN — RIVAROXABAN 15 MG: 10 TABLET, FILM COATED ORAL at 18:16

## 2025-01-03 RX ADMIN — CITALOPRAM HYDROBROMIDE 20 MG: 20 TABLET ORAL at 21:57

## 2025-01-03 RX ADMIN — MIRABEGRON 25 MG: 25 TABLET, FILM COATED, EXTENDED RELEASE ORAL at 08:07

## 2025-01-03 RX ADMIN — LEVOTHYROXINE SODIUM 112 MCG: 112 TABLET ORAL at 06:39

## 2025-01-03 RX ADMIN — CEFTRIAXONE SODIUM 2 G: 2 INJECTION, POWDER, FOR SOLUTION INTRAMUSCULAR; INTRAVENOUS at 14:18

## 2025-01-03 RX ADMIN — PANTOPRAZOLE SODIUM 40 MG: 40 TABLET, DELAYED RELEASE ORAL at 16:00

## 2025-01-03 RX ADMIN — RIVAROXABAN 15 MG: 10 TABLET, FILM COATED ORAL at 08:07

## 2025-01-03 ASSESSMENT — ACTIVITIES OF DAILY LIVING (ADL)
ADLS_ACUITY_SCORE: 82
ADLS_ACUITY_SCORE: 88
ADLS_ACUITY_SCORE: 82
ADLS_ACUITY_SCORE: 82
ADLS_ACUITY_SCORE: 88
ADLS_ACUITY_SCORE: 82
ADLS_ACUITY_SCORE: 82
ADLS_ACUITY_SCORE: 88
ADLS_ACUITY_SCORE: 82
ADLS_ACUITY_SCORE: 88
ADLS_ACUITY_SCORE: 82
ADLS_ACUITY_SCORE: 82
ADLS_ACUITY_SCORE: 88
ADLS_ACUITY_SCORE: 88

## 2025-01-03 NOTE — PROGRESS NOTES
[facility]    Medicine Progress Note - Hospitalist Service    Date of Admission:  12/27/2024    Assessment & Plan   Anni Valverde is a 74-year-old with underlying multisystem atrophy with progressive motor and autonomic dysfunction to include neurogenic bladder (indwelling Tamez catheter), orthostatic hypotension, generalized weakness basically wheelchair-bound, stage Ia breast cancer on Arimidex (Anastrozole), anxiety, hypothyroid, previous history of left-sided hydronephrosis with duplicated left ureter, frequent UTIs on chronic Macrobid admitted on December 27 with acute hypoxemic respiratory failure secondary to multiple segmental pulmonary emboli, also have an underlying urinary tract infection.    Acute hypoxemic respiratory failure, multifactorial, combination of bilateral pulmonary emboli, atelectasis, low lung volumes, increased oxygen demand, on 2 - 4 L of O2 via NC   Bilateral pulmonary emboli   Atelectasis  Low lung volumes  On admission, CT imaging consistent with bilateral PEs, no evidence on imaging of cardiac strain.  Echocardiogram is reassuring.    Patient was started on a heparin drip initially, transitioned to Xarelto 15 mg p.o. twice daily on 12/30/2024 for 21 days with plan to continue Xarelto 20 mg p.o. daily, duration to be determined outpatient.  -- discussed with [facility] hematology oncology in [facility] ([phone]), in agreement with starting Xarelto  -- Continue to monitor and titrate oxygen down and off as able    Chest x-ray was done and reviewed on 1/2/2025.  Low volume volumes.  Worsening atelectasis at the right lung base.  Worsening patchy left basilar opacity which may represent atelectasis and/or infection.  Clinically, infection is less likely.  There is no fever, no new upper respiratory symptoms, no increase in WBC  More likely that worsening hypoxia due to atelectasis  -- Continue to use incentive spirometer as much  as possible, did some practice with incentive spirometry    Troponin elevation secondary to pulmonary emboli suspect this is demand type II ischemia  Echocardiogram reassuring.  No evidence of cardiac strain.  EKG without ST elevation concerns.    -- Has been on telemetry, no concerns  -- Telemetry discontinued on 12/31/2024    Multiple system atrophy  Neurogenic bladder with indwelling Hays catheter  Orthostatic hypotension  Profound debility and weakness wheelchair-bound  Patient follows with [facility] neurology.  Spouse is primary caregiver and he is set up at home to assist the patient but she has to be able to participate in some of these activities   Patient continues to be weaker than baseline, and in general has been getting progressively weaker, likely secondary to underlying multiple chronic conditions  -- Physical therapy worked with patient today recommendation is long-term care versus TCU  -- Case management consulted on 12/31/2024, patient is medically ready to be discharged to a long-term care facility   -- pending placement     Underlying breast cancer stage Ia  Status post lumpectomy  and is currently on Arimidex. Initially was held due to hypercoagulable state.  -- Discussed with primary oncologist office, okay to restart anastrozole, restarted anastrozole 1 mg daily starting on 1/1/2025 (today)    History of recurrent urinary tract infections secondary to neurogenic bladder chronic Hays  UTI Klebsiella, UTI due to Indwelling Catheter   Neurogenic bladder  Previously on suppressive therapy with Macrobid, initially started on Zosyn, then de-escalated to Rocephin  Urine culture growing more than 100,000 CFU's per milliliter of Klebsiella pneumoniae (charge RN verified that hays was replaced in RN and then UA was collected, appreciate clarification)  -- finished a 7 day course of Rocephin on 1/3/2025  -- continue chronic Hays catheter care, Hays changed on 12/27/2024 ( hays catheter changed  "in the emergency room)  -- Continue home dosing of Mirabegron 25 mg daily   -- Continue home dosing of oxybutynin ER 5 mg daily before supper    Hydronephrosis of left kidney chronic  Duplicated left ureter  CT imaging showed possibility for worsening.   Renal ultrasound complete on 12/27/2024 was reassuring --normal kidney ultrasound.  No evidence of hydronephrosis  Continue follow-up with urology outpatient    Anxiety  Panic disorder  Due to acute hypoxemic respiratory failure Ativan on hold to further avoid respiratory suppression.  Patient has not required Ativan required or requested Ativan.  --Continue Celexa    GERD  On Prilosec PTA  -- Protonix 40 mg p.o. daily before supper  -- Tums 1000 mg 4 times daily as needed for heartburn    Hypothyroidism  -- Continue home dose of Synthroid, Synthroid 112 mcg daily  -- Last TSH was December 2, 2024, PCP is working on titrating dose of Synthroid, continue outpatient    Disposition:  -- Patient is medically ready to discharge to a long-term care facility, would need to discharge with oxygen at this time        Diet: Combination Diet Regular Diet Adult    DVT Prophylaxis: DOAC  Tamez Catheter: PRESENT, indication: Other (Comment) (chronic)  Lines: None     Cardiac Monitoring: None  Code Status: Full Code            Diet: Combination Diet Regular Diet Adult    DVT Prophylaxis: DOAC  Tamez Catheter: PRESENT, indication: Acute retention or obstruction  Lines: None     Cardiac Monitoring: None  Code Status: Full Code      Clinically Significant Risk Factors               # Hypoalbuminemia: Lowest albumin = 3.3 g/dL at 12/29/2024  5:08 AM, will monitor as appropriate                # Overweight: Estimated body mass index is 28.52 kg/m  as calculated from the following:    Height as of this encounter: 1.702 m (5' 7\").    Weight as of this encounter: 82.6 kg (182 lb 1.6 oz).      # Financial/Environmental Concerns: none         Social Drivers of Health            Disposition " Plan     Medically Ready for Discharge: Ready Now             Brittany Regalado MD  Hospitalist Service  [facility]  Securely message with [employer] (more info)  Text page via [employer] Paging/Directory   ______________________________________________________________________    Interval History   No new medical changes   Anni is working on IS, with 's encouragement    Physical Exam   Vital Signs: Temp: 97.6  F (36.4  C) Temp src: Oral BP: 105/50 Pulse: 77   Resp: 20 SpO2: 91 % O2 Device: Nasal cannula Oxygen Delivery: 4 LPM  Weight: 182 lbs 1.6 oz    General: Laying in bed, looks comfortable, very pleasant, on nasal cannula 4 supplemental oxygen  Respiratory: breaths are shallow, fair air movement, improved with incentive spirometry  Cardiovascular: Regular rate and rhythm  GI: Indwelling Tamez catheter, chronic    Medical Decision Making       25 MINUTES SPENT BY ME on the date of service doing chart review, history, exam, documentation & further activities per the note.      Data   ------------------------- PAST 24 HR DATA REVIEWED -----------------------------------------------        Imaging results reviewed over the past 24 hrs:   No results found for this or any previous visit (from the past 24 hours).

## 2025-01-03 NOTE — PLAN OF CARE
Goal Outcome Evaluation:      Plan of Care Reviewed With: patient, spouse    Overall Patient Progress: no changeOverall Patient Progress: no change    Outcome Evaluation: A&Ox4, VSS on 4L NC. A2 with sera steady and GB to comode bed/chair. up for meals in chair. eating 50-75% of meals. Taking oral meds and IV abx. Denies pain. Discharge planning to TCU/LTC in process. Family at bedside most of shift.    Temp: 97.6  F (36.4  C) Temp src: Oral BP: 105/50 Pulse: 77   Resp: 20 SpO2: 91 % O2 Device: Nasal cannula Oxygen Delivery: 4 LPM

## 2025-01-03 NOTE — PLAN OF CARE
Goal Outcome Evaluation:      Plan of Care Reviewed With: patient    Overall Patient Progress: improvingOverall Patient Progress: improving       VSS, weaned O2 down to 2LPM, saturation maintained at above 92%. Denies pain. Bm1x. Tamez in place with good urine output.

## 2025-01-03 NOTE — PROGRESS NOTES
Care Management Follow Up    Length of Stay (days): 7    Expected Discharge Date: 1/5/25      Concerns to be Addressed: discharge planning  needs LTC with therapy    Patient plan of care discussed at interdisciplinary rounds: Yes    Anticipated Discharge Disposition: Skilled Nursing Facility     Anticipated Discharge Services:  None  Anticipated Discharge DME: Oxygen    Patient/family educated on Medicare website which has current facility and service quality ratings: yes  Education Provided on the Discharge Plan: Yes  Patient/Family in Agreement with the Plan: yes    Referrals Placed by CM/SW: External Care Coordination, Post Acute Facilities, Transportation    Private pay costs discussed: private room/amenity fees and transportation costs    Discussed  Partnership in Safe Discharge Planning  document with patient/family: No     Handoff Completed: No, handoff not indicated or clinically appropriate    Additional Information:  Patient accepted at [facility] [phone] Phone [phone] Fax TCU transition to LTC. Bed available on Sunday.    Family transport [phone] on Sunday.  has wheelchair accessible van and will bring patient's own wheelchair from home. Patient will need portable O2 tank for transport.    [facility] Weekend Contact Info:  [phone] Phone [phone] Fax    JESSENIA Lopez  [facility] [phone]/ [facility] [phone]  Care Management

## 2025-01-03 NOTE — PLAN OF CARE
Goal Outcome Evaluation:      Plan of Care Reviewed With: spouse          Outcome Evaluation: LTC with therapies

## 2025-01-04 PROCEDURE — 250N000013 HC RX MED GY IP 250 OP 250 PS 637: Performed by: STUDENT IN AN ORGANIZED HEALTH CARE EDUCATION/TRAINING PROGRAM

## 2025-01-04 PROCEDURE — 99232 SBSQ HOSP IP/OBS MODERATE 35: CPT | Performed by: STUDENT IN AN ORGANIZED HEALTH CARE EDUCATION/TRAINING PROGRAM

## 2025-01-04 PROCEDURE — 120N000001 HC R&B MED SURG/OB

## 2025-01-04 RX ADMIN — RIVAROXABAN 15 MG: 10 TABLET, FILM COATED ORAL at 08:59

## 2025-01-04 RX ADMIN — ANASTROZOLE 1 MG: 1 TABLET, COATED ORAL at 08:59

## 2025-01-04 RX ADMIN — PANTOPRAZOLE SODIUM 40 MG: 40 TABLET, DELAYED RELEASE ORAL at 16:32

## 2025-01-04 RX ADMIN — MIRABEGRON 25 MG: 25 TABLET, FILM COATED, EXTENDED RELEASE ORAL at 08:59

## 2025-01-04 RX ADMIN — CITALOPRAM HYDROBROMIDE 20 MG: 20 TABLET ORAL at 21:39

## 2025-01-04 RX ADMIN — RIVAROXABAN 15 MG: 10 TABLET, FILM COATED ORAL at 17:31

## 2025-01-04 RX ADMIN — LEVOTHYROXINE SODIUM 112 MCG: 112 TABLET ORAL at 06:36

## 2025-01-04 RX ADMIN — OXYBUTYNIN CHLORIDE 5 MG: 5 TABLET, EXTENDED RELEASE ORAL at 16:32

## 2025-01-04 ASSESSMENT — ACTIVITIES OF DAILY LIVING (ADL)
ADLS_ACUITY_SCORE: 88

## 2025-01-04 NOTE — PLAN OF CARE
Goal Outcome Evaluation:                 Outcome Evaluation: Accept[facility] on Sunday

## 2025-01-04 NOTE — PROGRESS NOTES
[facility]    Medicine Progress Note - Hospitalist Service    Date of Admission:  12/27/2024    Assessment & Plan   Anni Valverde is a 74-year-old with underlying multisystem atrophy with progressive motor and autonomic dysfunction to include neurogenic bladder (indwelling Tamez catheter), orthostatic hypotension, generalized weakness basically wheelchair-bound, stage Ia breast cancer on Arimidex (Anastrozole), anxiety, hypothyroid, previous history of left-sided hydronephrosis with duplicated left ureter, frequent UTIs on chronic Macrobid admitted on December 27 with acute hypoxemic respiratory failure secondary to multiple segmental pulmonary emboli, also have an underlying urinary tract infection.    Acute hypoxemic respiratory failure, multifactorial, combination of bilateral pulmonary emboli, atelectasis, low lung volumes, increased oxygen demand, on 5LMP NC with humidification   Bilateral pulmonary emboli   Atelectasis  Low lung volumes  On admission, CT imaging consistent with bilateral PEs, no evidence on imaging of cardiac strain.  Echocardiogram is reassuring.    Patient was started on a heparin drip initially, transitioned to Xarelto 15 mg p.o. twice daily on 12/30/2024 for 21 days with plan to continue Xarelto 20 mg p.o. daily, duration to be determined outpatient.  -- discussed with [facility] hematology oncology in [facility] ([phone]), in agreement with starting Xarelto  -- Continue to monitor and titrate oxygen down and off as able    Chest x-ray was done and reviewed on 1/2/2025.  Low volume volumes.  Worsening atelectasis at the right lung base.  Worsening patchy left basilar opacity which may represent atelectasis and/or infection.  Clinically, infection is less likely.  There is no fever, no new upper respiratory symptoms, no increase in WBC  More likely that worsening hypoxia due to atelectasis  -- Continue to use incentive spirometer  "as much as possible    Troponin elevation secondary to pulmonary emboli suspect this is demand type II ischemia  Echocardiogram reassuring.  No evidence of cardiac strain.  EKG without ST elevation concerns.    -- Has been on telemetry, no concerns  -- Telemetry discontinued on 12/31/2024    Multiple system atrophy  Neurogenic bladder with indwelling Hays catheter  Orthostatic hypotension  Profound debility and weakness wheelchair-bound  Patient [facility] with [facility] neurology.  Spouse is primary caregiver and he is set up at home to assist the patient but she has to be able to participate in some of these activities   Patient continues to be weaker than baseline, and in general has been getting progressively weaker, likely secondary to underlying multiple chronic conditions  -- Physical therapy worked with patient, recommendation is long-term care versus TCU  -- Case management consulted on 12/31/2024, patient is medically ready to be discharged to a long-term care facility   -- \"Patient accepted at [facility] [phone] Phone [phone] Fax TCU transition to LTC. Bed available on Sunday\"    Underlying breast cancer stage Ia  Status post lumpectomy  and is currently on Arimidex. Initially was held due to hypercoagulable state.  -- Discussed with primary oncologist office, okay to restart anastrozole, restarted anastrozole 1 mg daily starting on 1/1/2025 (today)    History of recurrent urinary tract infections secondary to neurogenic bladder chronic Hays  UTI Klebsiella, UTI due to Indwelling Catheter   Neurogenic bladder  Previously on suppressive therapy with Macrobid, initially started on Zosyn, then de-escalated to Rocephin  Urine culture growing more than 100,000 CFU's per milliliter of Klebsiella pneumoniae (charge RN verified that hays was replaced in RN and then UA was collected, appreciate clarification)  -- finished a 7 day course of Rocephin on 1/3/2025  -- continue " "chronic Hays catheter care, Hays changed on 12/27/2024 ( hays catheter changed in the emergency room)  -- Continue home dosing of Mirabegron 25 mg daily   -- Continue home dosing of oxybutynin ER 5 mg daily before supper    Hydronephrosis of left kidney chronic  Duplicated left ureter  CT imaging showed possibility for worsening.   Renal ultrasound complete on 12/27/2024 was reassuring --normal kidney ultrasound.  No evidence of hydronephrosis  Continue follow-up with urology outpatient    Anxiety  Panic disorder  Due to acute hypoxemic respiratory failure Ativan on hold to further avoid respiratory suppression.  Patient has not required Ativan required or requested Ativan.  --Continue Celexa    GERD  On Prilosec PTA  -- Protonix 40 mg p.o. daily before supper  -- Tums 1000 mg 4 times daily as needed for heartburn    Hypothyroidism  -- Continue home dose of Synthroid, Synthroid 112 mcg daily  -- Last TSH was December 2, 2024, PCP is working on titrating dose of Synthroid, continue outpatient    Disposition:  -- \"Patient accepted at [facility] [phone] Phone [phone] Fax TCU transition to LTC. Bed available on Sunday.\"      Diet: Combination Diet Regular Diet Adult    DVT Prophylaxis: DOAC  Hays Catheter: PRESENT, indication: Other (Comment) (chronic - neurogenic bladder)  Lines: None     Cardiac Monitoring: None  Code Status: Full Code      Clinically Significant Risk Factors               # Hypoalbuminemia: Lowest albumin = 3.3 g/dL at 12/29/2024  5:08 AM, will monitor as appropriate                # Overweight: Estimated body mass index is 29.07 kg/m  as calculated from the following:    Height as of this encounter: 1.702 m (5' 7\").    Weight as of this encounter: 84.2 kg (185 lb 10 oz).      # Financial/Environmental Concerns: none         Social Drivers of Health            Disposition Plan     Medically Ready for Discharge: Ready Now             Brittany Regalado, " MD  Hospitalist Service  [facility]  Securely message with [employer] (more info)  Text page via [employer] Paging/Directory   ______________________________________________________________________    Interval History   Overnight, hypotensive 86/38, received a 500cc fluid bolus   Anni is laying in bed today, states that she feels fell, no changes. Anni tells me that family will visit late today   No new concerns     Physical Exam   Vital Signs: Temp: 97.4  F (36.3  C) Temp src: Oral BP: 102/47 Pulse: 77   Resp: 20 SpO2: 94 % O2 Device: Nasal cannula with humidification Oxygen Delivery: 5 LPM  Weight: 185 lbs 10.04 oz    General: Laying in bed, looks comfortable, very pleasant, on nasal cannula 5 supplemental oxygen  Respiratory: breaths are shallow, fair air movement  Cardiovascular: Regular rate and rhythm  GI: Indwelling Tamez catheter, chronic    Medical Decision Making       25 MINUTES SPENT BY ME on the date of service doing chart review, history, exam, documentation & further activities per the note.      Data   ------------------------- PAST 24 HR DATA REVIEWED -----------------------------------------------        Imaging results reviewed over the past 24 hrs:   No results found for this or any previous visit (from the past 24 hours).

## 2025-01-04 NOTE — PROGRESS NOTES
4 hr shift note:    Pt A&O. Difficulty with speaking chronically. BP hypotensive, 86/38, MAP 58. Fluid bolus started per MD. On 5 L O2 NC. LSC,

## 2025-01-04 NOTE — PLAN OF CARE
Goal Outcome Evaluation:      Plan of Care Reviewed With: patient    Overall Patient Progress: no changeOverall Patient Progress: no change    Outcome Evaluation: A&O. BP soft, 500ml bolus given with improvement. Automatic BP cuff was not reading properly, manual cuff used. On 5L O2 NC. A2 w/sarasteady, uses wheelchair at baseline. Denies pain. Turn and repo q2h.

## 2025-01-05 ENCOUNTER — LAB REQUISITION (OUTPATIENT)
Dept: LAB | Facility: CLINIC | Age: 75
End: 2025-01-05
Payer: MEDICARE

## 2025-01-05 VITALS
SYSTOLIC BLOOD PRESSURE: 118 MMHG | OXYGEN SATURATION: 93 % | RESPIRATION RATE: 20 BRPM | HEIGHT: 67 IN | WEIGHT: 184.75 LBS | BODY MASS INDEX: 29 KG/M2 | HEART RATE: 77 BPM | DIASTOLIC BLOOD PRESSURE: 59 MMHG | TEMPERATURE: 98.1 F

## 2025-01-05 DIAGNOSIS — R76.12 NONSPECIFIC REACTION TO CELL MEDIATED IMMUNITY MEASUREMENT OF GAMMA INTERFERON ANTIGEN RESPONSE WITHOUT ACTIVE TUBERCULOSIS: ICD-10-CM

## 2025-01-05 PROBLEM — N39.0 UTI DUE TO KLEBSIELLA SPECIES: Status: ACTIVE | Noted: 2024-12-27

## 2025-01-05 PROBLEM — I24.89 DEMAND ISCHEMIA (H): Status: ACTIVE | Noted: 2025-01-05

## 2025-01-05 PROBLEM — Z96.0 INDWELLING URINARY CATHETER PRESENT: Status: ACTIVE | Noted: 2022-09-27

## 2025-01-05 PROBLEM — B96.89 UTI DUE TO KLEBSIELLA SPECIES: Status: ACTIVE | Noted: 2024-12-27

## 2025-01-05 PROCEDURE — 99239 HOSP IP/OBS DSCHRG MGMT >30: CPT | Performed by: PEDIATRICS

## 2025-01-05 PROCEDURE — 250N000013 HC RX MED GY IP 250 OP 250 PS 637: Performed by: STUDENT IN AN ORGANIZED HEALTH CARE EDUCATION/TRAINING PROGRAM

## 2025-01-05 RX ORDER — POLYETHYLENE GLYCOL 3350 17 G/17G
17 POWDER, FOR SOLUTION ORAL DAILY
DISCHARGE
Start: 2025-01-05

## 2025-01-05 RX ADMIN — ANASTROZOLE 1 MG: 1 TABLET, COATED ORAL at 08:39

## 2025-01-05 RX ADMIN — MIRABEGRON 25 MG: 25 TABLET, FILM COATED, EXTENDED RELEASE ORAL at 08:39

## 2025-01-05 RX ADMIN — POLYETHYLENE GLYCOL 3350 17 G: 17 POWDER, FOR SOLUTION ORAL at 08:39

## 2025-01-05 RX ADMIN — RIVAROXABAN 15 MG: 10 TABLET, FILM COATED ORAL at 08:39

## 2025-01-05 ASSESSMENT — ACTIVITIES OF DAILY LIVING (ADL)
ADLS_ACUITY_SCORE: 88

## 2025-01-05 NOTE — PLAN OF CARE
Goal Outcome Evaluation:      Plan of Care Reviewed With: patient    Overall Patient Progress: improvingOverall Patient Progress: improving    Outcome Evaluation: A&O. VSS on 5L O2 NC. A2 w/sarasteady, uses wheelchair at baseline. Denies pain. Turn and repo q2h. IV saline locked. Chronic hays in place with good output.

## 2025-01-05 NOTE — DISCHARGE SUMMARY
"[facility]  Hospitalist Discharge Summary      Date of Admission:  12/27/2024  Date of Discharge:  1/5/2025  Discharging Provider: Augusto Stevens MD  Discharge Service: Hospitalist Service    Discharge Diagnoses   Principal Problem:    Bilateral pulmonary embolism (H)  Active Problems:    Acute respiratory failure with hypoxia (H)    Multiple system atrophy (H)    Neurogenic bladder    Indwelling urinary catheter present    Orthostatic hypotension    UTI due to Klebsiella species    Malignant neoplasm of lower-outer quadrant of right breast of female, estrogen receptor positive (H)    Demand ischemia (H)    Hypothyroidism following radioiodine therapy    Panic disorder    Duplicated ureter, left    Anxiety    Gastroesophageal reflux disease without esophagitis      Clinically Significant Risk Factors     # Overweight: Estimated body mass index is 28.94 kg/m  as calculated from the following:    Height as of this encounter: 1.702 m (5' 7\").    Weight as of this encounter: 83.8 kg (184 lb 11.9 oz).       Follow-ups Needed After Discharge   Follow-up Appointments       Follow Up and recommended labs and tests      Follow up with Nursing home physician.                Discharge Disposition   Discharged to nursing home  Condition at discharge: Stable    Hospital Course   Anni Valverde is a 74-year-old with underlying multisystem atrophy with progressive motor and autonomic dysfunction including neurogenic bladder (indwelling Tamez catheter), orthostatic hypotension, and generalized weakness (basically wheelchair-bound), stage Ia breast cancer on anastrozole, anxiety, hypothyroidism, left-sided hydronephrosis with duplicated left ureter, and frequent UTIs treated chronically with suppressive Macrobid presented with worsening shortness of breath and weakness and was admitted due to concern for acute hypoxemic respiratory failure associated with multiple segmental pulmonary " emboli.      Bilateral pulmonary emboli with acute hypoxemic respiratory failure  Low lung volumes from neuromuscular weakness with atelectasis  CT imaging demonstrated bilateral PEs without radiographic evidence of cardiac strain.  Echocardiogram demonstrated normal RV size and function.  Pulmonary emboli were initially treated with heparin infusion followed by transition to Xarelto after discussion with her hematology oncology team.  Higher dose Xarelto 15 mg p.o. twice daily was started on 12/30/2024 with plan to complete 21 days of the higher dose followed by change in dose to Xarelto 20 mg p.o. once daily.  Plan was to determine duration of anticoagulation treatment and outpatient follow-up.  She was severely hypoxic during hospitalization concerning for acute hypoxic respiratory failure and was treated with oxygen supplementation.  Hypoxia persisted after treatment of pulmonary emboli, and results of radiographic imaging were also suspicious for low lung volumes with atelectasis.  Her activity level was limited at baseline because of her neurologic disease.  She was treated with incentive spirometry.  Oxygenation remained stable on low-flow 2 to 6 L nasal cannula oxygen supplementation for several days prior to discharge.  Acute hypoxic respiratory failure was attributed to the combination of bilateral pulmonary emboli but also neuromuscular respiratory low lung volumes and atelectasis.      Demand type II ischemia  She presented with mildly elevated troponin but did not have angina or ischemic EKG changes and there were no wall motion abnormalities on echocardiogram.  She had no arrhythmias on telemetry monitoring.  Demand ischemia due to pulmonary emboli with severe hypoxia was suspected.    Multiple system atrophy  Neurogenic bladder with indwelling Tamez catheter  Orthostatic hypotension  Profound debility and weakness wheelchair-bound  Patient follows with [facility] neurology for her known chronic  neurologic disease with multiple system atrophy.  Spouse has been her primary caregiver and had previously been set up at home to assist the patient who historically had to be able to participate in ADL activities at home.  In the context of acute illness, patient was weaker than baseline.  She was evaluated by PT and OT during hospitalization.  Generalized weakness persisted even as acute medical problems were treated and improved.  PT and OT recommended ongoing therapies at discharge.  Because of her acute functional decline superimposed upon advanced neurologic disease at baseline, discharge to long-term care facility with attempted therapy was recommended with which they were agreeable.    Underlying breast cancer stage Ia  She had previous history of stage Ia breast cancer and was status post lumpectomy with ongoing Arimidex treatment.  Arimidex initially was held because of acute venous thromboembolism.  After discussion with her primary hematology oncology team, they recommended restarting anastrozole which she appeared to tolerate.      Neurogenic bladder with chronically indwelling urinary catheter  Klebsiella UTI  History of recurrent urinary tract infections  Due to her neurologic disease she had neurogenic bladder for which she had indwelling urinary catheter.  UA which was collected at admission after removal of her previous urinary catheter  and replacement with a fresh catheter on 12/27.  That UA was abnormal with pyuria, and urine culture subsequently grew Klebsiella raising suspicion for Klebsiella UTI which was probably a catheter associated urinary tract infection.  She previously had been treated with suppressive therapy with Macrobid which was held while she was treated with parenteral antibiotics for UTI.  She completed treatment course of parenteral antibiotics during this hospitalization after which Macrobid was restarted.  Chronic doses of mirabegron and oxybutynin were continued.  At  discharge, urinary catheter cares and periodic replacement of urinary catheter was recommended.    Hydronephrosis of left kidney chronic  Duplicated left ureter  CT imaging showed possibility for worsening hydronephrosis, but subsequent renal ultrasound was without evidence of hydronephrosis.    Anxiety  Panic disorder  Due to acute hypoxemic respiratory failure and neuromuscular weakness and risk of respiratory suppression from benzodiazepines, previous use of as needed Ativan was discontinued.  Chronic dose of Celexa was continued and patient was clinically stable without use of benzodiazepines.    GERD  Chronic PPI therapy for GERD was continued    Hypothyroidism  Hypothyroidism.  Stable while continuing chronic dose of Synthroid    Consultations This Hospital Stay   PHARMACY IP CONSULT  PHARMACY LIAISON FOR MEDICATION COVERAGE CONSULT  PHYSICAL THERAPY ADULT IP CONSULT  CARE MANAGEMENT / SOCIAL WORK IP CONSULT  PHYSICAL THERAPY ADULT IP CONSULT  OCCUPATIONAL THERAPY ADULT IP CONSULT    Code Status   Full Code    Time Spent on this Encounter   I, Augusto Stevens MD, personally saw the patient today and spent greater than 30 minutes discharging this patient.       Augusto Stevens MD  [facility] MEDICAL SURGICAL  [address] [place] [address]  Phone: [phone]  ______________________________________________________________________    Physical Exam   Vital Signs: Temp: 98.1  F (36.7  C) Temp src: Oral BP: 118/59 Pulse: 77   Resp: 20 SpO2: 93 % O2 Device: Nasal cannula with humidification Oxygen Delivery: 5 LPM  Weight: 184 lbs 11.93 oz  General Appearance: Chronically ill-appearing woman without signs of acute distress resting in bed currently wearing low-flow nasal cannula  Respiratory: Normal respiratory effort although respiratory effort appears weak, diminished breath sounds throughout with clear lung fields  Cardiovascular: Regular rate and rhythm  Neuro: Alert and appears  to maintain wakefulness and attention, speech is mildly to moderately dysarthric       Primary Care Physician   Lenin Morgan    Discharge Orders      General info for SNF    Length of Stay Estimate: Long Term Care  Condition at Discharge: Stable  Level of care:skilled   Rehabilitation Potential: Poor  Admission H&P remains valid and up-to-date: Yes  Recent Chemotherapy: N/A  Use Nursing Home Standing Orders: Yes     Mantoux instructions    Give two-step Mantoux (PPD) Per Facility Policy Yes     Follow Up and recommended labs and tests    Follow up with Nursing home physician.     Reason for your hospital stay    Hospitalized due to breathing problems and found to have blood clots in lungs that improved with treatment     Tamez catheter    To straight gravity drainage. Change catheter every 2 weeks and PRN for leaking or decreased urine output with signs of bladder distention. DO NOT change catheter without a specific Provider order IF diagnosis of benign prostatic hypertrophy (BPH), neurogenic bladder, or other urological conditions     Activity - Up with nursing assistance     Full Code     Physical Therapy Adult Consult    Evaluate and treat as clinically indicated.    Reason:  acute PE with respiratory failure, underlying MSA     Occupational Therapy Adult Consult    Evaluate and treat as clinically indicated.    Reason:  acute PE with respiratory failure, underlying MSA     Oxygen (SNF/TCU) Discharge     Fall precautions     Diet    Follow this diet upon discharge: Current Diet:Orders Placed This Encounter      Combination Diet Regular Diet Adult       Significant Results and Procedures   Most Recent 3 CBC's:  Recent Labs   Lab Test 01/02/25  0544 12/30/24  0514 12/29/24  0508   WBC 4.3 5.5 6.1   HGB 11.9 11.2* 11.1*   MCV 91 90 90    222 203     Most Recent 3 BMP's:  Recent Labs   Lab Test 01/02/25  0544 12/30/24  0514 12/29/24  0508    138 137   POTASSIUM 4.0 3.9 3.9   CHLORIDE 102 105 105    CO2 25 23 20*   BUN 12.9 14.4 13.8   CR 0.87 0.86 0.98*   ANIONGAP 11 10 12   GERARDO 9.4 9.2 8.9   GLC 96 96 104*     Most Recent 2 LFT's:  Recent Labs   Lab Test 12/29/24  0508 12/27/24  1046   AST 71* 35   ALT 32 22   ALKPHOS 87 107   BILITOTAL 0.8 1.1     Most Recent Urinalysis:  Recent Labs   Lab Test 12/27/24  1302   COLOR Yellow   APPEARANCE Clear   URINEGLC Negative   URINEBILI Negative   URINEKETONE 40*   SG 1.016   UBLD Moderate*   URINEPH 6.5   PROTEIN 10*   NITRITE Negative   LEUKEST Moderate*   RBCU <1   WBCU 20*     Results for orders placed or performed during the hospital encounter of 12/27/24   XR Chest Port 1 View    Narrative    XR CHEST PORT 1 VIEW 12/27/2024 11:05 AM    HISTORY: Hypoxia    COMPARISON: None.      Impression    IMPRESSION: Elevated right hemidiaphragm. Trace right pleural effusion  and mild bibasilar atelectasis. Pulmonary vascular congestion. Normal  heart size.    MATTHEW LANCE MD         SYSTEM ID:  [id number]   CT Chest Pulmonary Embolism w Contrast     Value    Radiologist flags Pulmonary embolism (AA)    Narrative    CT CHEST PULMONARY EMBOLISM W CONTRAST 12/27/2024 1:33 PM    CLINICAL HISTORY: Significant hypoxia with unknown etiology.  Elevated  D-dimer.  TECHNIQUE: CT angiogram chest during arterial phase injection IV  contrast. 2D and 3D MIP reconstructions were performed by the CT  technologist. Dose reduction techniques were used.     CONTRAST: 64mL Isovue 370    COMPARISON: None.    FINDINGS:  ANGIOGRAM CHEST: Filling defects in segmental pulmonary artery in the  right upper lobe (series 4, image 115) and left lower lobe (4/116),  consistent with pulmonary emboli. Thoracic aorta is negative for  dissection. No CT evidence of right heart strain.    LUNGS AND PLEURA: Small left and trace right pleural effusion. Mild  bibasilar atelectasis. No suspicious pulmonary nodules or masses. No  pulmonary edema or infarct.    MEDIASTINUM/AXILLAE: No lymphadenopathy. Mildly dilated  ascending  thoracic aorta measuring 3.7 x 3.9 cm. Mild coronary artery  calcifications. No pericardial effusion. Small hiatal hernia.    UPPER ABDOMEN: Partially visualized mild left hydronephrosis.    MUSCULOSKELETAL: No suspicious lesions within the bones.      Impression    IMPRESSION:  1.  Positive for pulmonary embolism with small emboli in bilateral  segmental pulmonary emboli. No CT evidence of right heart strain.  2.  Small left and trace right pleural effusions. Bibasilar  atelectasis.  3.  Partly visualized possible mild left hydronephrosis. Consider  follow-up ultrasound or CT for complete evaluation.    [Critical Result: Pulmonary embolism]    Finding was identified on 12/27/2024 1:35 PM.     Dr. Luke was contacted by me on 12/27/2024 1:48 PM and verbalized  understanding of the critical result.      MATTHEW LANCE MD         SYSTEM ID:  [id number]   US Renal Complete Non-Vascular    Narrative    EXAM: US RENAL COMPLETE NON-VASCULAR  LOCATION: [facility]  DATE: 12/28/2024    INDICATION: concer for hydronephrosis on CT in patient with multisystem atrophy and neurogenic bladder with chronic indwelling hays  COMPARISON: CT 12/27/2024 and ultrasound 4/1/2024  TECHNIQUE: Routine Bilateral Renal and Bladder Ultrasound.    FINDINGS:    RIGHT KIDNEY: 11.2 x 4.1 x 3.9 cm. Normal cortical echogenicity and thickness. No shadowing stone or focal lesion. No evidence of hydronephrosis.     LEFT KIDNEY: 11.0 x 6.5 x 6.2 cm. The exam is limited due to overlying bowel gas. Normal cortical echogenicity and thickness. No shadowing stone or focal lesion. No evidence of hydronephrosis.     BLADDER: Decompressed by a Hays catheter.      Impression    IMPRESSION:  1.  Normal kidney ultrasound. No evidence of hydronephrosis.   US Lower Extremity Venous Duplex Bilateral    Narrative    EXAM: US LOWER EXTREMITY VENOUS DUPLEX BILATERAL  LOCATION: [facility]  DATE:  2024    INDICATION: Acute pulmonary artery emboli  COMPARISON: None.  TECHNIQUE: Venous Duplex ultrasound of bilateral lower extremities with and without compression, augmentation and duplex. Color flow and spectral Doppler with waveform analysis performed.    FINDINGS: Exam includes the common femoral, femoral, popliteal veins as well as segmentally visualized deep calf veins and greater saphenous vein.     RIGHT: No deep vein thrombosis. No superficial thrombophlebitis. No popliteal cyst.    LEFT: No deep vein thrombosis. No superficial thrombophlebitis. No popliteal cyst.      Impression    IMPRESSION:  1.  No deep venous thrombosis in the bilateral lower extremities.   XR Chest Port 1 View    Narrative    CHEST ONE VIEW  2025 2:54 PM     HISTORY: recent PE with resp failure and acute on chronic debility,  worsening oxygenation, ?infiltrates/atelectasis    COMPARISON: Chest x-ray 2024.      Impression    IMPRESSION: Stable cardiac silhouette. Low lung volumes. Worsening  platelike atelectasis of the right lung base. Worsening patchy left  basilar opacity which may represent atelectasis and/or pneumonia.  Trace left pleural effusion. No pneumothorax.    FERNANDO VILLAVICENCIO MD         SYSTEM ID:  [id number]   Echocardiogram Complete     Value    LVEF  60%    Narrative    [id number]  [id number]  [id number]  [id number]^LUCY^MAGRAUX^NORMAN     [facility]  Echocardiography Laboratory  [address] [place] [address]     Name: MADDI ROLDAN  MRN: [id number]  : 1950  Study Date: 2024 12:46 PM  Age: 74 yrs  Gender: Female  Patient Location: [facility]  Reason For Study: Abn EKG  Ordering Physician: MARGAUX AYALA  Referring Physician: Lenin Morgan  Performed By: Kelly Almaraz     BSA: 1.9 m2  Height: 67 in  Weight: 181 lb  HR: 67  BP: 114/79 mmHg  ______________________________________________________________________________  Procedure  Echocardiogram with  two-dimensional, color and spectral Doppler.  ______________________________________________________________________________  Interpretation Summary     1. The left ventricle is normal in structure, function and size. The visual  ejection fraction is estimated at 60%.  2. The right ventricle is normal in structure, function and size.  3. No valve disease.  4. Ascending aorta dilatation is present. 3.9cm.     No previous echo for comparison.  ______________________________________________________________________________  Left Ventricle  The left ventricle is normal in structure, function and size. There is normal  left ventricular wall thickness. The visual ejection fraction is estimated at  60%. Left ventricular diastolic function is normal. Normal left ventricular  wall motion.     Right Ventricle  The right ventricle is normal in structure, function and size.     Atria  Normal left atrial size. Right atrial size is normal. There is no atrial shunt  seen.     Mitral Valve  There is no mitral regurgitation noted.     Tricuspid Valve  There is mild (1+) tricuspid regurgitation.     Aortic Valve  There is mild trileaflet aortic sclerosis.     Pulmonic Valve  The pulmonic valve is normal in structure and function.     Vessels  Ascending aorta dilatation is present. The inferior vena cava was normal in  size with preserved respiratory variability.     Pericardium  There is no pericardial effusion.     Rhythm  Sinus rhythm was noted.  ______________________________________________________________________________  MMode/2D Measurements & Calculations  IVSd: 1.2 cm     LVIDd: 4.0 cm  LVIDs: 2.4 cm  LVPWd: 1.1 cm  FS: 40.4 %  LV mass(C)d: 156.6 grams  LV mass(C)dI: 80.8 grams/m2  Ao root diam: 3.3 cm  LA dimension: 3.1 cm  asc Aorta Diam: 3.9 cm  LA/Ao: 0.92  LVOT diam: 2.1 cm  LVOT area: 3.4 cm2  Ao root diam index Ht(cm/m): 2.0  Ao root diam index BSA (cm/m2): 1.7  Asc Ao diam index BSA (cm/m2): 2.0  Asc Ao diam index  Ht(cm/m): 2.3  LA Volume (BP): 35.5 ml     LA Volume Index (BP): 18.3 ml/m2  RWT: 0.57     Doppler Measurements & Calculations  MV E max torin: 101.0 cm/sec  MV A max torin: 113.8 cm/sec  MV E/A: 0.89  MV dec slope: 421.1 cm/sec2  MV dec time: 0.24 sec  TR max torin: 325.8 cm/sec  TR max P.5 mmHg  E/E' av.4  Lateral E/e': 19.8  Medial E/e': 18.9     ______________________________________________________________________________  Report approved by: Kevyn Talavera MD on 2024 03:25 PM             Discharge Medications   Current Discharge Medication List        START taking these medications    Details   rivaroxaban ANTICOAGULANT (XARELTO) 2.5 MG TABS tablet Take 6 tablets (15 mg) by mouth 2 times daily (with meals) for 15 days, THEN 8 tablets (20 mg) daily (with dinner).    Associated Diagnoses: Multiple subsegmental pulmonary emboli without acute cor pulmonale (H)           CONTINUE these medications which have CHANGED    Details   polyethylene glycol (MIRALAX) 17 GM/Dose powder Take 17 g by mouth daily.    Associated Diagnoses: Multiple system atrophy (H)           CONTINUE these medications which have NOT CHANGED    Details   acetaminophen (TYLENOL) 500 MG tablet Take 500 mg by mouth every 6 hours as needed      anastrozole (ARIMIDEX) 1 MG tablet Take 1 tablet by mouth daily.      calcium-vitamin D (CALTRATE) 600-400 MG-UNIT per tablet Take 1 tablet by mouth 2 times daily      citalopram (CELEXA) 20 MG tablet Take 20 mg by mouth at bedtime.      diclofenac (VOLTAREN) 1 % topical gel Place 2 g onto the skin 4 times daily as needed for moderate pain (bilateral knees).      levothyroxine (SYNTHROID) 112 MCG tablet Take 112 mcg by mouth every morning (before breakfast). BRAND NAME ONLY      mirabegron (MYRBETRIQ) 25 MG 24 hr tablet Take 1 tablet by mouth daily.      nitroFURantoin macrocrystal-monohydrate (MACROBID) 100 MG capsule Take 100 mg by mouth daily. prophy      omeprazole (PRILOSEC) 40 MG DR  capsule Take 40 mg by mouth every evening. One hour before supper      oxyBUTYnin ER (DITROPAN XL) 5 MG 24 hr tablet Take 5 mg by mouth daily (before supper).           STOP taking these medications       LORazepam (ATIVAN) 0.5 MG tablet Comments:   Reason for Stopping:             Allergies   Allergies   Allergen Reactions    Ibuprofen Swelling     Edema, oral, patient tolerates excedrin (aspirin) occasionally

## 2025-01-05 NOTE — PROGRESS NOTES
S-(situation): Patient discharged to [facility] via Private Ride with Family    B-(background): Bilateral Pulmonary Embolism    A-(assessment): Patient alert and orientated x4. Baseline wheelchair. Patient currently on 5L O2 NC. Chronic Hays in place. Patient AVS printed off, given to patient and reviewed with patient and spouse. Time given to ask questions. All questions answered. All belongings with patient at discharge.     R-(recommendations): Discharge instructions reviewed with patient and spouse. Listed belongings gathered and returned to patient.          Discharge Nursing Criteria:   Patient Education given and documented: Yes  Care Plan and Patient education resolved: Yes    New Medications- pt has been educated about purpose and side effects: Yes    Vaccines  Influenza status verified at discharge:  Yes    Intentionally Retained Items (examples: Drains, Wound packing, ureteral stents, hays, PICC line or IV)  Patient was sent home with Chronic Hays left in place.   Follow up appointment made for removal: No    MISC  Prescriptions if needed, hard copies sent with patient  NA  Home medications returned to patient: Yes  Medication Bin checked and emptied on discharge Yes  Patient reports post-discharge pain management plan is effective: Yes

## 2025-01-05 NOTE — PROGRESS NOTES
Care Management Discharge Note    Discharge Date: 01/05/2025     Discharge Disposition: Skilled Nursing Facility : [facility]    Discharge Services:  PT/OT     Discharge DME: Oxygen    Discharge Transportation: Spouse--Has transport van     Private pay costs discussed: private room/amenity fees--once pt is no longer skilled under Medicare.     Does the patient's insurance plan have a 3 day qualifying hospital stay waiver?  No    PAS Confirmation Code: [id number]  Patient/family educated on Medicare website which has current facility and service quality ratings: yes    Education Provided on the Discharge Plan: Yes  Persons Notified of Discharge Plans: Spouse Abran  Patient/Family in Agreement with the Plan: yes    Handoff Referral Completed: Yes, non-[facility] PCP: External handoff communication completed    Additional Information:  Update received during IDT rounds. Informed Pt remains medically stable to transfer to [facility] today. Pt admitting with Therapy Orders under skilled Medicare initially- plan is to work on strength as long as able.     Pt and spouse aware it will be private pay once pt can no longer be covered under Medicare. Pt has private pay funds and will remain in LTC     CM placed call to the Admission's # at [facility] # [phone]  Accepting of Pt's admission today.     Requested to have discharge orders faxed to [phone]    Spouse Abran at the hospital ready to assist with transportation.  Has their own wheelchair van and wheelchair to utilize for patient.     Spouse Abran will need portable O2 tank for transport         Discharge Plan:      [facility]   # [phone]   Fax: [phone]      External handoff completed           JESSENIA Blackburn  [facility] [phone]   [facility]  [phone]

## 2025-01-05 NOTE — PLAN OF CARE
Goal Outcome Evaluation:      Plan of Care Reviewed With: patient    Overall Patient Progress: no changeOverall Patient Progress: no change    Outcome Evaluation: [id number]: A&Ox4. VSS on 5L NC. Denies SOB at rest. Dyspneic with activity. Denies pain. Up A2 with vivek steady to chair for meals. Turn and repo q2hrs. Chronic hays with AUOP. Incontinent of stool.

## 2025-01-12 ENCOUNTER — HEALTH MAINTENANCE LETTER (OUTPATIENT)
Age: 75
End: 2025-01-12

## 2025-01-14 ENCOUNTER — LAB REQUISITION (OUTPATIENT)
Dept: LAB | Facility: CLINIC | Age: 75
End: 2025-01-14
Payer: MEDICARE

## 2025-01-14 DIAGNOSIS — A15.0 TUBERCULOSIS OF LUNG: ICD-10-CM

## 2025-01-14 DIAGNOSIS — A41.9 SEPSIS, UNSPECIFIED ORGANISM (H): ICD-10-CM

## 2025-01-15 ENCOUNTER — LAB REQUISITION (OUTPATIENT)
Dept: LAB | Facility: CLINIC | Age: 75
End: 2025-01-15
Payer: MEDICARE

## 2025-01-15 DIAGNOSIS — A41.9 SEPSIS, UNSPECIFIED ORGANISM (H): ICD-10-CM

## 2025-01-15 DIAGNOSIS — E03.9 HYPOTHYROIDISM, UNSPECIFIED: ICD-10-CM

## 2025-01-15 DIAGNOSIS — A15.0 TUBERCULOSIS OF LUNG: ICD-10-CM

## 2025-01-17 LAB
ALBUMIN SERPL BCG-MCNC: 3.3 G/DL (ref 3.5–5.2)
ALP SERPL-CCNC: 88 U/L (ref 40–150)
ALT SERPL W P-5'-P-CCNC: 32 U/L (ref 0–50)
ANION GAP SERPL CALCULATED.3IONS-SCNC: 13 MMOL/L (ref 7–15)
AST SERPL W P-5'-P-CCNC: 37 U/L (ref 0–45)
BILIRUB SERPL-MCNC: 0.5 MG/DL
BUN SERPL-MCNC: 12.5 MG/DL (ref 8–23)
CALCIUM SERPL-MCNC: 9.6 MG/DL (ref 8.8–10.4)
CHLORIDE SERPL-SCNC: 104 MMOL/L (ref 98–107)
CREAT SERPL-MCNC: 0.85 MG/DL (ref 0.51–0.95)
EGFRCR SERPLBLD CKD-EPI 2021: 72 ML/MIN/1.73M2
ERYTHROCYTE [DISTWIDTH] IN BLOOD BY AUTOMATED COUNT: 12.2 % (ref 10–15)
GLUCOSE SERPL-MCNC: 92 MG/DL (ref 70–99)
HCO3 SERPL-SCNC: 23 MMOL/L (ref 22–29)
HCT VFR BLD AUTO: 35.4 % (ref 35–47)
HGB BLD-MCNC: 11.3 G/DL (ref 11.7–15.7)
MCH RBC QN AUTO: 29.5 PG (ref 26.5–33)
MCHC RBC AUTO-ENTMCNC: 31.9 G/DL (ref 31.5–36.5)
MCV RBC AUTO: 92 FL (ref 78–100)
PLATELET # BLD AUTO: 324 10E3/UL (ref 150–450)
POTASSIUM SERPL-SCNC: 3.6 MMOL/L (ref 3.4–5.3)
PROT SERPL-MCNC: 6.2 G/DL (ref 6.4–8.3)
RBC # BLD AUTO: 3.83 10E6/UL (ref 3.8–5.2)
SODIUM SERPL-SCNC: 140 MMOL/L (ref 135–145)
TSH SERPL DL<=0.005 MIU/L-ACNC: 1.87 UIU/ML (ref 0.3–4.2)
WBC # BLD AUTO: 6.1 10E3/UL (ref 4–11)

## 2025-01-17 PROCEDURE — 86481 TB AG RESPONSE T-CELL SUSP: CPT | Performed by: FAMILY MEDICINE

## 2025-01-17 PROCEDURE — 84155 ASSAY OF PROTEIN SERUM: CPT | Performed by: FAMILY MEDICINE

## 2025-01-17 PROCEDURE — 85018 HEMOGLOBIN: CPT | Performed by: FAMILY MEDICINE

## 2025-01-17 PROCEDURE — 84443 ASSAY THYROID STIM HORMONE: CPT | Performed by: FAMILY MEDICINE

## 2025-01-17 PROCEDURE — 84520 ASSAY OF UREA NITROGEN: CPT | Performed by: FAMILY MEDICINE

## 2025-01-17 PROCEDURE — 85048 AUTOMATED LEUKOCYTE COUNT: CPT | Performed by: FAMILY MEDICINE

## 2025-01-17 PROCEDURE — P9603 ONE-WAY ALLOW PRORATED MILES: HCPCS | Performed by: FAMILY MEDICINE

## 2025-01-17 PROCEDURE — 36415 COLL VENOUS BLD VENIPUNCTURE: CPT | Performed by: FAMILY MEDICINE

## 2025-01-17 PROCEDURE — 80048 BASIC METABOLIC PNL TOTAL CA: CPT | Performed by: FAMILY MEDICINE

## 2025-01-18 LAB
GAMMA INTERFERON BACKGROUND BLD IA-ACNC: 0.01 IU/ML
M TB IFN-G BLD-IMP: ABNORMAL
M TB IFN-G CD4+ BCKGRND COR BLD-ACNC: 0.44 IU/ML
MITOGEN IGNF BCKGRD COR BLD-ACNC: 0 IU/ML
MITOGEN IGNF BCKGRD COR BLD-ACNC: 0 IU/ML
QUANTIFERON MITOGEN: 0.45 IU/ML
QUANTIFERON NIL TUBE: 0.01 IU/ML
QUANTIFERON TB1 TUBE: 0.01 IU/ML
QUANTIFERON TB2 TUBE: 0.01

## 2025-01-26 ENCOUNTER — LAB REQUISITION (OUTPATIENT)
Dept: LAB | Facility: CLINIC | Age: 75
End: 2025-01-26
Payer: MEDICARE

## 2025-01-26 DIAGNOSIS — A15.0 TUBERCULOSIS OF LUNG: ICD-10-CM

## 2025-01-27 PROCEDURE — 86481 TB AG RESPONSE T-CELL SUSP: CPT | Performed by: FAMILY MEDICINE

## 2025-01-27 PROCEDURE — 36415 COLL VENOUS BLD VENIPUNCTURE: CPT | Performed by: FAMILY MEDICINE

## 2025-01-27 PROCEDURE — P9603 ONE-WAY ALLOW PRORATED MILES: HCPCS | Performed by: FAMILY MEDICINE

## 2025-01-28 LAB
QUANTIFERON MITOGEN: 0.86 IU/ML
QUANTIFERON NIL TUBE: 0.1 IU/ML
QUANTIFERON TB1 TUBE: 0.1 IU/ML
QUANTIFERON TB2 TUBE: 0.11

## 2025-01-29 LAB
GAMMA INTERFERON BACKGROUND BLD IA-ACNC: 0.1 IU/ML
M TB IFN-G BLD-IMP: NEGATIVE
M TB IFN-G CD4+ BCKGRND COR BLD-ACNC: 0.76 IU/ML
MITOGEN IGNF BCKGRD COR BLD-ACNC: 0 IU/ML
MITOGEN IGNF BCKGRD COR BLD-ACNC: 0.01 IU/ML

## 2025-02-09 ENCOUNTER — HEALTH MAINTENANCE LETTER (OUTPATIENT)
Age: 75
End: 2025-02-09

## 2025-03-04 LAB
ATRIAL RATE - MUSE: 84 BPM
DIASTOLIC BLOOD PRESSURE - MUSE: NORMAL MMHG
INTERPRETATION ECG - MUSE: NORMAL
P AXIS - MUSE: 46 DEGREES
PR INTERVAL - MUSE: 138 MS
QRS DURATION - MUSE: 72 MS
QT - MUSE: 354 MS
QTC - MUSE: 418 MS
R AXIS - MUSE: -10 DEGREES
SYSTOLIC BLOOD PRESSURE - MUSE: NORMAL MMHG
T AXIS - MUSE: -1 DEGREES
VENTRICULAR RATE- MUSE: 84 BPM